# Patient Record
Sex: MALE | Race: WHITE | NOT HISPANIC OR LATINO | Employment: FULL TIME | URBAN - METROPOLITAN AREA
[De-identification: names, ages, dates, MRNs, and addresses within clinical notes are randomized per-mention and may not be internally consistent; named-entity substitution may affect disease eponyms.]

---

## 2017-01-02 PROBLEM — L91.8 INFLAMED ACROCHORDON: Status: ACTIVE | Noted: 2017-01-02

## 2017-01-02 PROBLEM — K13.21 LEUKOPLAKIA ORAL MUCOSA: Status: ACTIVE | Noted: 2017-01-02

## 2017-01-06 PROCEDURE — 00000346 ZZHCL STATISTIC REVIEW OUTSIDE SLIDES TC 88321: Performed by: DERMATOLOGY

## 2017-01-23 LAB — COPATH REPORT: NORMAL

## 2017-08-31 ENCOUNTER — CARE COORDINATION (OUTPATIENT)
Dept: GASTROENTEROLOGY | Facility: CLINIC | Age: 47
End: 2017-08-31

## 2019-10-15 ENCOUNTER — TRANSFERRED RECORDS (OUTPATIENT)
Dept: HEALTH INFORMATION MANAGEMENT | Facility: CLINIC | Age: 49
End: 2019-10-15
Payer: OTHER GOVERNMENT

## 2019-11-08 ENCOUNTER — HEALTH MAINTENANCE LETTER (OUTPATIENT)
Age: 49
End: 2019-11-08

## 2020-12-06 ENCOUNTER — HEALTH MAINTENANCE LETTER (OUTPATIENT)
Age: 50
End: 2020-12-06

## 2021-09-25 ENCOUNTER — HEALTH MAINTENANCE LETTER (OUTPATIENT)
Age: 51
End: 2021-09-25

## 2022-01-15 ENCOUNTER — HEALTH MAINTENANCE LETTER (OUTPATIENT)
Age: 52
End: 2022-01-15

## 2022-01-16 ENCOUNTER — HOSPITAL ENCOUNTER (EMERGENCY)
Facility: CLINIC | Age: 52
Discharge: HOME | End: 2022-01-16
Attending: EMERGENCY MEDICINE | Admitting: EMERGENCY MEDICINE
Payer: OTHER GOVERNMENT

## 2022-01-16 VITALS
RESPIRATION RATE: 18 BRPM | DIASTOLIC BLOOD PRESSURE: 91 MMHG | SYSTOLIC BLOOD PRESSURE: 140 MMHG | OXYGEN SATURATION: 95 % | TEMPERATURE: 98.1 F | HEIGHT: 71 IN | BODY MASS INDEX: 33.6 KG/M2 | WEIGHT: 240 LBS | HEART RATE: 90 BPM

## 2022-01-16 DIAGNOSIS — R40.4 NONSPECIFIC PAROXYSMAL SPELL: ICD-10-CM

## 2022-01-16 LAB
ALBUMIN SERPL-MCNC: 4.2 G/DL (ref 3.4–5)
ALBUMIN UR-MCNC: NEGATIVE MG/DL
ALP SERPL-CCNC: 116 U/L (ref 40–150)
ALT SERPL W P-5'-P-CCNC: 38 U/L (ref 0–70)
ANION GAP SERPL CALCULATED.3IONS-SCNC: 6 MMOL/L (ref 3–14)
APPEARANCE UR: CLEAR
AST SERPL W P-5'-P-CCNC: 20 U/L (ref 0–45)
BASOPHILS # BLD AUTO: 0 10E3/UL (ref 0–0.2)
BASOPHILS NFR BLD AUTO: 0 %
BILIRUB SERPL-MCNC: 0.4 MG/DL (ref 0.2–1.3)
BILIRUB UR QL STRIP: NEGATIVE
BUN SERPL-MCNC: 11 MG/DL (ref 7–30)
CA-I BLD-MCNC: 4.6 MG/DL (ref 4.4–5.2)
CALCIUM SERPL-MCNC: 9 MG/DL (ref 8.5–10.1)
CHLORIDE BLD-SCNC: 106 MMOL/L (ref 94–109)
CK SERPL-CCNC: 124 U/L (ref 30–300)
CO2 SERPL-SCNC: 25 MMOL/L (ref 20–32)
COLOR UR AUTO: YELLOW
CREAT SERPL-MCNC: 0.9 MG/DL (ref 0.66–1.25)
EOSINOPHIL # BLD AUTO: 0.1 10E3/UL (ref 0–0.7)
EOSINOPHIL NFR BLD AUTO: 1 %
ERYTHROCYTE [DISTWIDTH] IN BLOOD BY AUTOMATED COUNT: 13.2 % (ref 10–15)
GFR SERPL CREATININE-BSD FRML MDRD: >90 ML/MIN/1.73M2
GLUCOSE BLD-MCNC: 109 MG/DL (ref 70–99)
GLUCOSE BLDC GLUCOMTR-MCNC: 106 MG/DL (ref 70–99)
GLUCOSE UR STRIP-MCNC: NEGATIVE MG/DL
HCT VFR BLD AUTO: 48.4 % (ref 40–53)
HGB BLD-MCNC: 16.4 G/DL (ref 13.3–17.7)
HGB UR QL STRIP: NEGATIVE
HOLD SPECIMEN: NORMAL
HOLD SPECIMEN: NORMAL
IMM GRANULOCYTES # BLD: 0 10E3/UL
IMM GRANULOCYTES NFR BLD: 1 %
KETONES UR STRIP-MCNC: NEGATIVE MG/DL
LEUKOCYTE ESTERASE UR QL STRIP: NEGATIVE
LIPASE SERPL-CCNC: 127 U/L (ref 73–393)
LYMPHOCYTES # BLD AUTO: 1.7 10E3/UL (ref 0.8–5.3)
LYMPHOCYTES NFR BLD AUTO: 23 %
MAGNESIUM SERPL-MCNC: 2 MG/DL (ref 1.6–2.3)
MCH RBC QN AUTO: 29.3 PG (ref 26.5–33)
MCHC RBC AUTO-ENTMCNC: 33.9 G/DL (ref 31.5–36.5)
MCV RBC AUTO: 86 FL (ref 78–100)
MONOCYTES # BLD AUTO: 0.7 10E3/UL (ref 0–1.3)
MONOCYTES NFR BLD AUTO: 9 %
MUCOUS THREADS #/AREA URNS LPF: PRESENT /LPF
NEUTROPHILS # BLD AUTO: 4.9 10E3/UL (ref 1.6–8.3)
NEUTROPHILS NFR BLD AUTO: 66 %
NITRATE UR QL: NEGATIVE
NRBC # BLD AUTO: 0 10E3/UL
NRBC BLD AUTO-RTO: 0 /100
NT-PROBNP SERPL-MCNC: 19 PG/ML (ref 0–900)
PH UR STRIP: 6 [PH] (ref 5–7)
PHOSPHATE SERPL-MCNC: 1.4 MG/DL (ref 2.5–4.5)
PLATELET # BLD AUTO: 205 10E3/UL (ref 150–450)
POTASSIUM BLD-SCNC: 3.8 MMOL/L (ref 3.4–5.3)
PROT SERPL-MCNC: 8 G/DL (ref 6.8–8.8)
RBC # BLD AUTO: 5.6 10E6/UL (ref 4.4–5.9)
RBC URINE: 2 /HPF
SODIUM SERPL-SCNC: 137 MMOL/L (ref 133–144)
SP GR UR STRIP: 1.01 (ref 1–1.03)
SQUAMOUS EPITHELIAL: <1 /HPF
TROPONIN I SERPL HS-MCNC: 6 NG/L
TSH SERPL DL<=0.005 MIU/L-ACNC: 1.8 MU/L (ref 0.4–4)
UROBILINOGEN UR STRIP-MCNC: NORMAL MG/DL
WBC # BLD AUTO: 7.4 10E3/UL (ref 4–11)
WBC URINE: 1 /HPF

## 2022-01-16 PROCEDURE — 250N000013 HC RX MED GY IP 250 OP 250 PS 637: Performed by: EMERGENCY MEDICINE

## 2022-01-16 PROCEDURE — 99284 EMERGENCY DEPT VISIT MOD MDM: CPT | Mod: 25 | Performed by: EMERGENCY MEDICINE

## 2022-01-16 PROCEDURE — 80053 COMPREHEN METABOLIC PANEL: CPT | Performed by: EMERGENCY MEDICINE

## 2022-01-16 PROCEDURE — 83880 ASSAY OF NATRIURETIC PEPTIDE: CPT | Performed by: EMERGENCY MEDICINE

## 2022-01-16 PROCEDURE — 96360 HYDRATION IV INFUSION INIT: CPT | Performed by: EMERGENCY MEDICINE

## 2022-01-16 PROCEDURE — 96361 HYDRATE IV INFUSION ADD-ON: CPT | Performed by: EMERGENCY MEDICINE

## 2022-01-16 PROCEDURE — 82550 ASSAY OF CK (CPK): CPT | Performed by: EMERGENCY MEDICINE

## 2022-01-16 PROCEDURE — 83735 ASSAY OF MAGNESIUM: CPT | Performed by: EMERGENCY MEDICINE

## 2022-01-16 PROCEDURE — 84100 ASSAY OF PHOSPHORUS: CPT | Performed by: EMERGENCY MEDICINE

## 2022-01-16 PROCEDURE — 258N000003 HC RX IP 258 OP 636: Performed by: EMERGENCY MEDICINE

## 2022-01-16 PROCEDURE — 85004 AUTOMATED DIFF WBC COUNT: CPT | Performed by: EMERGENCY MEDICINE

## 2022-01-16 PROCEDURE — 84443 ASSAY THYROID STIM HORMONE: CPT | Performed by: EMERGENCY MEDICINE

## 2022-01-16 PROCEDURE — 82330 ASSAY OF CALCIUM: CPT | Performed by: EMERGENCY MEDICINE

## 2022-01-16 PROCEDURE — 81001 URINALYSIS AUTO W/SCOPE: CPT | Performed by: EMERGENCY MEDICINE

## 2022-01-16 PROCEDURE — 36415 COLL VENOUS BLD VENIPUNCTURE: CPT | Performed by: EMERGENCY MEDICINE

## 2022-01-16 PROCEDURE — 93010 ELECTROCARDIOGRAM REPORT: CPT | Performed by: EMERGENCY MEDICINE

## 2022-01-16 PROCEDURE — 84484 ASSAY OF TROPONIN QUANT: CPT | Performed by: EMERGENCY MEDICINE

## 2022-01-16 PROCEDURE — 83690 ASSAY OF LIPASE: CPT | Performed by: EMERGENCY MEDICINE

## 2022-01-16 RX ORDER — HYDROXYZINE HYDROCHLORIDE 25 MG/1
25 TABLET, FILM COATED ORAL ONCE
Status: COMPLETED | OUTPATIENT
Start: 2022-01-16 | End: 2022-01-16

## 2022-01-16 RX ADMIN — SODIUM CHLORIDE 1000 ML: 9 INJECTION, SOLUTION INTRAVENOUS at 14:23

## 2022-01-16 RX ADMIN — POTASSIUM & SODIUM PHOSPHATES POWDER PACK 280-160-250 MG 2 PACKET: 280-160-250 PACK at 16:04

## 2022-01-16 RX ADMIN — HYDROXYZINE HYDROCHLORIDE 25 MG: 25 TABLET, FILM COATED ORAL at 16:27

## 2022-01-16 ASSESSMENT — MIFFLIN-ST. JEOR: SCORE: 1950.76

## 2022-01-16 NOTE — ED TRIAGE NOTES
Pt arrives ambulatory with spouse - they were on their way to [place] to be seen by cardiology tomorrow morning. Pt has hx of tachy/arminda syndrome. Pt has tremors throughout body and having anxiety. Pt states he has also been having diarrhea. Symptoms started getting worse 3 hours ago.

## 2022-01-16 NOTE — ED PROVIDER NOTES
"    [place] EMERGENCY DEPARTMENT ([facility])  1/16/22    History     Chief Complaint   Patient presents with     Hypertension     HPI  Malcolm Rosa is a 52 year old male with a history of HTN, possible POTS who presents to the Emergency Department with an episode of hypertension, lightheadedness.  Patient reports he was on his way to the [facility] from [place] for an appointment with internal medicine tomorrow when he had a \"neural endocrine type attack.\"  Patient reports he has been having these attacks for 7+ years and they have been progressively getting worse.  He reports symptoms of hypertension, brain fog, explosive diarrhea, lightheadedness, irritability, anxiety, and nose burning.  Patient reports attacks which can be caused by exercise, stress, sometimes food, and \"dental epinephrine.\"  Patient reports he has not been doing well for the past 1.5 months.  He states he had \"weird cardiology things\" in November and believes he had nocturnal V. tach.  Patient currently has a loop recorder but did not have it at that time.  Loop recorder has seen episodes of NSVT and PVCs per patient.  Patient reports he has been staying at home and not working so he has had less stress and has had only 1-2 minor events.  He states he will wake up in the middle of the night and his pulse will jump from 46 to 146.  He states he was not doing well over [place] and had been worrying more.  He states he thinks he had ulcers around that time with symptoms of gas, bloating, nausea.  He states he took Pepcid and Prilosec and symptoms improved.  Patient reports he tried working out and losing weight in October but developed more arrhythmia and problems so stopped exercise.  Patient reports fluids help symptoms.      Today, patient reports he did not feel well when leaving his house.  He states he has noted that even driving causes stress that can trigger these episodes.  He states symptoms build up over 3 hours.  Initially " his heart rate was fine but he felt slightly hypertensive and lightheaded.  He states when he got here he had an attack and became really hypertensive believing he had systolic in the 200s.  He states he was also lightheaded and thought he was going to pass out.  Patient reports symptoms resolve within about 2 hours and he will feel tired after.  He states he really is currently coming down but is feeling tired.  Patient reports he did have a headache today during the episode.  He states headaches are typical but he has noted more headaches recently compared to when symptoms first started years ago.  Patient denies associated chest pain or shortness of breath.  He notes some abdominal cramping in the hypogastric region.  Patient denies nausea, vomiting, leg swelling, or urinary symptoms.  He states he does have history of microscopic blood in urinalysis but no gross blood seen in urine.  Patient denies numbness, tingling, or weakness.  He states his hands and feet get cold, specifically at night.      Patient has had extensive work-up for these symptoms.  His cardiologist in [place] has diagnosed him with POTS but he thinks this diagnosis was rushed.  He states he sometimes has lightheadedness when standing up quickly and tachycardia at night but does not think POTS diagnosis fits at all times.  Patient reports hypertension is acute and not chronic.  He states he has tried taking low-dose metoprolol but blood pressure gets too low when he feels dizzy.  Patient has had a bone biopsy for mass activation, mastocytosis was negative.  Patient has had CT scans looking for masses and tumors without significant findings beyond diverticuli.  Patient had a recent EGD that was similar to previous.  He has had imaging on his head done although this was years ago.  He states at one time there was thought to be a potential pituitary mass but on second opinion it was normal.  Patient has been prescribed alprazolam for symptoms but  "has not taken this.  Patient has been tested for CY.  He has also been tested for pheochromocytoma, states mother had this.  Patient has family cardiac history, states grandfather  at 53 and mother had quadruple bypass at 74.  Patient denies alcohol or drug use.  He does note some caffeine use with coffee in the morning.  He states this causes \"bile acid diarrhea\" that burns, has an odor, is watery, and makes him feel \"off.\"  He denies blood in the stool.  Patient denies recent illness, fever, or cough.  No known COVID exposure.  Patient reports he has received 1 dose of COVID vaccination but then his health issues worsened.    Past Medical History  Past Medical History:   Diagnosis Date     Abnormal laboratory test     slightly elevated glucagon     Benign essential hypertension      Chronic fatigue syndrome      Epistaxis      Fatty liver      Gynecomastia      Herniated cervical disc without myelopathy      Hyperglycemia      Hypophosphatemia      Insomnia      Left varicocele      Prostatic hypertrophy      Pure hypercholesterolemia      Past Surgical History:   Procedure Laterality Date     ARTHROSCOPY KNEE BILATERAL       CHOLECYSTECTOMY  11/13/15     FUSION CERVICAL ANTERIOR ONE LEVEL      C6-7     HERNIA REPAIR Bilateral     inguinal and umbilical     VARICOCELECTOMY Left      VASECTOMY       clobetasol (TEMOVATE) 0.05 % GEL topical gel  multivitamin, therapeutic with minerals (THERA-VIT-M) TABS      Allergies   Allergen Reactions     Prednisone Other (See Comments)     In high doses. Became nauseated and confused also.     Family History  Family History   Problem Relation Age of Onset     Skin Cancer No family hx of      Melanoma No family hx of      Social History   Social History     Tobacco Use     Smoking status: Never Smoker     Smokeless tobacco: Not on file   Substance Use Topics     Alcohol use: Not on file     Drug use: Not on file      Past medical history, past surgical history, medications, " "allergies, family history, and social history were reviewed with the patient. No additional pertinent items.       Review of Systems  A complete review of systems was performed with pertinent positives and negatives noted in the HPI, and all other systems negative.    Physical Exam   BP: (!) 177/109  Pulse: 100  Temp: 98.1  F (36.7  C)  Resp: 18  Height: 180.3 cm (5' 11\")  Weight: 108.9 kg (240 lb)  SpO2: 99 %  Physical Exam  Vitals reviewed.   Constitutional:       General: He is not in acute distress.     Appearance: He is well-developed.   HENT:      Head: Normocephalic and atraumatic.      Nose: Nose normal.      Mouth/Throat:      Mouth: Mucous membranes are moist.   Eyes:      General: No visual field deficit.     Extraocular Movements: Extraocular movements intact.      Conjunctiva/sclera: Conjunctivae normal.      Pupils: Pupils are equal, round, and reactive to light.   Cardiovascular:      Rate and Rhythm: Normal rate and regular rhythm.      Pulses: Normal pulses.      Heart sounds: Normal heart sounds. No murmur heard.  Pulmonary:      Effort: Pulmonary effort is normal. No respiratory distress.      Breath sounds: Normal breath sounds. No stridor. No wheezing or rales.   Abdominal:      General: Bowel sounds are normal. There is no distension.      Palpations: Abdomen is soft. There is no mass.      Tenderness: There is no abdominal tenderness. There is no guarding or rebound.   Musculoskeletal:         General: No swelling or tenderness. Normal range of motion.      Cervical back: Normal range of motion and neck supple. No rigidity.   Skin:     General: Skin is warm and dry.      Capillary Refill: Capillary refill takes less than 2 seconds.      Findings: No rash.   Neurological:      General: No focal deficit present.      Mental Status: He is alert and oriented to person, place, and time.      GCS: GCS eye subscore is 4. GCS verbal subscore is 5. GCS motor subscore is 6.      Cranial Nerves: Cranial " nerves are intact. No cranial nerve deficit, dysarthria or facial asymmetry.      Sensory: Sensation is intact. No sensory deficit.      Motor: No weakness, abnormal muscle tone or pronator drift.      Coordination: Coordination normal. Finger-Nose-Finger Test and Heel to Shin Test normal.      Deep Tendon Reflexes:      Reflex Scores:       Brachioradialis reflexes are 2+ on the right side and 2+ on the left side.       Patellar reflexes are 2+ on the right side and 2+ on the left side.     Comments: No clonus. 5/5 strength in all 4 extremities bilaterally. Sensation intact in all 4 extremities and face bilaterally.    Psychiatric:         Mood and Affect: Mood normal.         ED Course   1:40 PM  The patient was seen and examined by Angelica Ramirez MD in Room ED11.      Procedures            EKG Interpretation:      Interpreted by Angelica Ramirez MD  Time reviewed: 1:45 pm  Symptoms at time of EKG: light headedness   Rhythm: normal sinus   Rate: normal  Axis: normal  Ectopy: none  Conduction: normal  ST Segments/ T Waves: No ST-T wave changes  Q Waves: none  Comparison to prior: No old EKG available    Clinical Impression: Normal sinus rhythm.  Normal intervals.  No evidence of acute ischemia.  No sign of WPW, LVH, Brugada syndrome, or right ventricular dysplasia.        The medical record was reviewed and interpreted.  Current labs reviewed and interpreted.  EKG reviewed and interpreted: As above.       ED Course as of 03/27/22 2359   Sun Jan 16, 2022   1437 Patient declines CXR, CT of the head, or further chest or abdominal imaging.    1437 CT angio of the chest and CT of the abdomen and pelvis with contrast on 11/24/21 was without PE or masses or acute pathology per care everywhere review.             Results for orders placed or performed during the hospital encounter of 01/16/22   Comprehensive metabolic panel     Status: Abnormal   Result Value Ref Range    Sodium 137 133 - 144 mmol/L    Potassium 3.8  3.4 - 5.3 mmol/L    Chloride 106 94 - 109 mmol/L    Carbon Dioxide (CO2) 25 20 - 32 mmol/L    Anion Gap 6 3 - 14 mmol/L    Urea Nitrogen 11 7 - 30 mg/dL    Creatinine 0.90 0.66 - 1.25 mg/dL    Calcium 9.0 8.5 - 10.1 mg/dL    Glucose 109 (H) 70 - 99 mg/dL    Alkaline Phosphatase 116 40 - 150 U/L    AST 20 0 - 45 U/L    ALT 38 0 - 70 U/L    Protein Total 8.0 6.8 - 8.8 g/dL    Albumin 4.2 3.4 - 5.0 g/dL    Bilirubin Total 0.4 0.2 - 1.3 mg/dL    GFR Estimate >90 >60 mL/min/1.73m2   Magnesium     Status: Normal   Result Value Ref Range    Magnesium 2.0 1.6 - 2.3 mg/dL   TSH with free T4 reflex     Status: Normal   Result Value Ref Range    TSH 1.80 0.40 - 4.00 mU/L   Troponin I     Status: Normal   Result Value Ref Range    Troponin I High Sensitivity 6 <79 ng/L   UA with Microscopic reflex to Culture     Status: Abnormal    Specimen: Urine, Midstream   Result Value Ref Range    Color Urine Yellow Colorless, Straw, Light Yellow, Yellow    Appearance Urine Clear Clear    Glucose Urine Negative Negative mg/dL    Bilirubin Urine Negative Negative    Ketones Urine Negative Negative mg/dL    Specific Gravity Urine 1.012 1.003 - 1.035    Blood Urine Negative Negative    pH Urine 6.0 5.0 - 7.0    Protein Albumin Urine Negative Negative mg/dL    Urobilinogen Urine Normal Normal, 2.0 mg/dL    Nitrite Urine Negative Negative    Leukocyte Esterase Urine Negative Negative    Mucus Urine Present (A) None Seen /LPF    RBC Urine 2 <=2 /HPF    WBC Urine 1 <=5 /HPF    Squamous Epithelials Urine <1 <=1 /HPF    Narrative    Urine Culture not indicated   CBC with platelets and differential     Status: None   Result Value Ref Range    WBC Count 7.4 4.0 - 11.0 10e3/uL    RBC Count 5.60 4.40 - 5.90 10e6/uL    Hemoglobin 16.4 13.3 - 17.7 g/dL    Hematocrit 48.4 40.0 - 53.0 %    MCV 86 78 - 100 fL    MCH 29.3 26.5 - 33.0 pg    MCHC 33.9 31.5 - 36.5 g/dL    RDW 13.2 10.0 - 15.0 %    Platelet Count 205 150 - 450 10e3/uL    % Neutrophils 66 %    %  Lymphocytes 23 %    % Monocytes 9 %    % Eosinophils 1 %    % Basophils 0 %    % Immature Granulocytes 1 %    NRBCs per 100 WBC 0 <1 /100    Absolute Neutrophils 4.9 1.6 - 8.3 10e3/uL    Absolute Lymphocytes 1.7 0.8 - 5.3 10e3/uL    Absolute Monocytes 0.7 0.0 - 1.3 10e3/uL    Absolute Eosinophils 0.1 0.0 - 0.7 10e3/uL    Absolute Basophils 0.0 0.0 - 0.2 10e3/uL    Absolute Immature Granulocytes 0.0 <=0.4 10e3/uL    Absolute NRBCs 0.0 10e3/uL   Extra Blue Top Tube     Status: None   Result Value Ref Range    Hold Specimen JIC    Extra Red Top Tube     Status: None   Result Value Ref Range    Hold Specimen JIC    Lipase     Status: Normal   Result Value Ref Range    Lipase 127 73 - 393 U/L   Ionized Calcium     Status: Normal   Result Value Ref Range    Calcium Ionized 4.6 4.4 - 5.2 mg/dL   Phosphorus     Status: Abnormal   Result Value Ref Range    Phosphorus 1.4 (L) 2.5 - 4.5 mg/dL   CK total     Status: Normal   Result Value Ref Range     30 - 300 U/L   Nt probnp inpatient (BNP)     Status: Normal   Result Value Ref Range    N terminal Pro BNP Inpatient 19 0 - 900 pg/mL   Glucose by meter     Status: Abnormal   Result Value Ref Range    GLUCOSE BY METER POCT 106 (H) 70 - 99 mg/dL   EKG 12 lead     Status: None   Result Value Ref Range    Systolic Blood Pressure  mmHg    Diastolic Blood Pressure  mmHg    Ventricular Rate 76 BPM    Atrial Rate 76 BPM    NE Interval 180 ms    QRS Duration 88 ms     ms    QTc 423 ms    P Axis 41 degrees    R AXIS 38 degrees    T Axis 21 degrees    Interpretation ECG       Sinus rhythm  Normal ECG  Unconfirmed report - interpretation of this ECG is computer generated - see medical record for final interpretation  Confirmed by - EMERGENCY ROOM, PHYSICIAN ([id number]),  GREGG AYERS ([id number]) on 1/18/2022 5:58:21 PM     CBC with platelets differential     Status: None    Narrative    The following orders were created for panel order CBC with platelets differential.  Procedure                                Abnormality         Status                     ---------                               -----------         ------                     CBC with platelets and d...[[id number]]                      Final result                 Please view results for these tests on the individual orders.   [place] Draw     Status: None    Narrative    The following orders were created for panel order [place] Draw.  Procedure                               Abnormality         Status                     ---------                               -----------         ------                     Extra Blue Top Tube[[id number]]                              Final result               Extra Red Top Tube[[id number]]                               Final result                 Please view results for these tests on the individual orders.   Extra Tube *Canceled*     Status: None ()    Narrative    The following orders were created for panel order Extra Tube.  Procedure                               Abnormality         Status                     ---------                               -----------         ------                     Extra Green Top (Lithium...[[id number]]                                                   Please view results for these tests on the individual orders.     Medications   0.9% sodium chloride BOLUS (0 mLs Intravenous Stopped 1/16/22 1628)   potassium & sodium phosphates (NEUTRA-PHOS) Packet 2 packet (2 packets Oral Given 1/16/22 1604)   hydrOXYzine (ATARAX) tablet 25 mg (25 mg Oral Given 1/16/22 1997)        Assessments & Plan (with Medical Decision Making)   Patient presents with now having ongoing issues with episodes of spells as he is describing above.  This has been going on for the last 7 years but worsening over the last month and a half or so in discussion with the patient and his wife.  He was actually driving down to [place] for an appointment tomorrow for further work-up of this when he had a  spell and thus pulled over here to be seen.  He has had a very large work-up as discussed above an attempt to try to diagnose these spells.  He has had multiple imaging and laboratory studies done.  He does have family history of pheochromocytoma and so he has been tested for this without sign of this previously.  He is convinced that this is a neuroendocrine attack that is causing this.  There is also been some suggestion of possible POTS.  At this point however this is still been uncertain and thus he was going to [facility] for further evaluation.  At the time of my evaluation, he reports that he is feeling improved As this does typically improve spontaneously.  His blood pressure is mildly elevated to the 150s to 140s range.  On presentation he was 177/109 with a heart rate of 100.  Unfortunately the differential diagnosis for this is difficult as he has had multiple in-depth evaluations as discussed above.  Did consider the potential of electrolyte dysfunction, hypoglycemia, anxiety reaction, neuroendocrine cause however this has not yet been found, atypical acute coronary syndrome, rhabdomyolysis, thyroid dysfunction, intracranial cause.  Did discuss performing CT of the head however patient declines this at this time as he has had this previously and is planning to follow-up with [facility] tomorrow.  He did have a CT angiogram of the chest and CT of the abdomen and pelvis with contrast in November that was without PE or masses or acute pathology per care everywhere review.  He declines chest x-ray here any further chest or abdominal imaging as he states he has had multiple of these in the past would like to avoid further radiation currently until he has more testing performed at [facility].  Did discuss the risks of not performing these imaging studies including missing life-threatening diagnoses such as intracranial mass or hemorrhage, PE, intra-abdominal mass and he voiced understanding stating all of these things have been  evaluated previously.  Did obtain laboratory studies here.  We did start IV fluids as he had stated this is improved his symptoms in the past.    I was called into the room by the nurse as he reportedly started having an episode after the fluids had gone in half way.  He did appear anxious and was somewhat tremulous however his blood pressure remained stable in the 140s.  Do question whether the cold aspect of the fluids may have caused him to feel this way or whether this could have potentially been an anxiety reaction.  He states this is very similar to his other episodes however usually does not happen with fluids.  Discussed that I do not have an exact explanation for this.  Did offer hydroxyzine which he did take and did feel improved with.  He is also question whether this could be a mast activation syndrome.  Did check his glucose at that time and this was still within normal limits at 106.  Also considered an arrhythmia however no sign of arrhythmia on the monitor during this episode.  EKG was also obtained which revealed normal sinus rhythm with normal intervals and no evidence of acute ischemia.  Has a loop recorder that unfortunately cannot interrogate.  However again he was monitored during this episode without any signs of arrhythmia making this less likely.  CMP reveals normal electrolytes normal renal function and normal liver function.  Magnesium is within normal limits.  Troponin is within normal limits.  With reassuring EKG without signs of acute ischemia felt that acute coronary syndrome was less likely.  EKG also did not show any signs of Brugada syndrome, LVH, WPW, or right ventricular dysplasia.  No sign of arrhythmia.  CBC reveals a normal white blood cell count of 7.4 with hemoglobin of 16.4.  TSH is within normal limits.  Lipase is within normal limits.  BNP is within normal limits.  CK is also within normal limits.  Urinalysis was unremarkable without any evidence of UTI or hematuria.   Phosphorus was low at 1.4 and thus this was replaced with potassium sodium phosphate orally.  Ionized calcium is normal at 4.6.  He was feeling improved on reevaluation after the hydroxyzine.    Had a long discussion with the patient and his wife that I do not know exactly what is causing his symptoms.  Discussed the potential for observation here in our hospital for further evaluation versus proceeding on to [place] for the testing that is planned for tomorrow.  Did discuss the potential that this could be anxiety driven but do understand his concern for a medical cause as this has not been improving.  After discussion they did prefer to continue to [place] to have their evaluation as planned tomorrow.  He does have a prescription for alprazolam that he has not tried in the past.  I discussed that if he were to have further issue during the car ride to mail that he could try this as this would be unlikely to cause harm with a one-time dose.  Did discuss the risk of addiction to this medicine which he already understands and is why he has not been taking this yet at this point.  However again did discuss that a one-time dose to get him through the episode to get him to [place] would be appropriate.  Discussed return precautions.  He and his wife are in agreement with this plan.  He was discharged in stable condition.    I have reviewed the nursing notes. I have reviewed the findings, diagnosis, plan and need for follow up with the patient.    Discharge Medication List as of 1/16/2022  6:30 PM          Final diagnoses:   Nonspecific paroxysmal spell     IRiya, am serving as a trained medical scribe to document services personally performed by Angelica Ramirez MD, based on the provider's statements to me.      I, Angelica Ramirez MD, was physically present and have reviewed and verified the accuracy of this note documented by Riya Villanueva.      --  Angelica Ramirez MD  [facility] EMERGENCY  DEPARTMENT  1/16/2022     Angelica Ramirez MD  03/28/22 0056

## 2022-01-17 NOTE — DISCHARGE INSTRUCTIONS
Continue to follow up at [place] as scheduled.     Return to the ED if you develop any new or worsening concerns.

## 2022-01-18 LAB
ATRIAL RATE - MUSE: 76 BPM
DIASTOLIC BLOOD PRESSURE - MUSE: NORMAL MMHG
INTERPRETATION ECG - MUSE: NORMAL
P AXIS - MUSE: 41 DEGREES
PR INTERVAL - MUSE: 180 MS
QRS DURATION - MUSE: 88 MS
QT - MUSE: 376 MS
QTC - MUSE: 423 MS
R AXIS - MUSE: 38 DEGREES
SYSTOLIC BLOOD PRESSURE - MUSE: NORMAL MMHG
T AXIS - MUSE: 21 DEGREES
VENTRICULAR RATE- MUSE: 76 BPM

## 2022-04-11 ENCOUNTER — MEDICAL CORRESPONDENCE (OUTPATIENT)
Dept: HEALTH INFORMATION MANAGEMENT | Facility: CLINIC | Age: 52
End: 2022-04-11
Payer: OTHER GOVERNMENT

## 2022-04-14 NOTE — TELEPHONE ENCOUNTER
RECORDS RECEIVED FROM:  External   DATE RECEIVED: 4.18.22   NOTES STATUS DETAILS   OFFICE NOTE from referring provider        OFFICE NOTE from other cardiologists   and neurologists Care Everywhere 4.12.22 Renetta Manrique  4.11.22 Renetta Blake  3.14.22 (loop recorder visit), [facility]  3.8.22 Renetta Will   More in CE if needed   DISCHARGE SUMMARY from hospital        DISCHARGE REPORT from the ER    Care Everywhere 4.6.22 [facility]  [facility]  3.4.22 [place]  3.2.22 [facility]  More in [facility]    OPERATIVE REPORT        MEDICATION LIST   Care Everywhere    LABS     BMP   Care Everywhere 3.20.22   CBC   Care Everywhere 3.4.22   CMP   Care Everywhere 4.6.22   Lipids       TSH   Care Everywhere 3.4.22   DIAGNOSTIC PROCEDURES     EKG  Strips *important In process 4.12.22 [facility]  4.7.22 [facility]  3.20.22 [facility]  3.14.22 [facility]   Monitor Reports  Strips *important Received 3.15.21 Paceart device evaluation, [facility]   Cardioversions       ICD/pacemaker implant       Tilt table studies       IMAGING (DISC & REPORT)      ECHO's   PACS 3.20.22, 10.27.21 [facility]   Stress Tests       Cath       CT/CTA       MRI/MRA         Action 4.14.22 MJ   Action Taken Sent request to [facility] and [facility]     Action 4.18.22 MJ   Action Taken Received report and notes from [facility]. Sent to urgent scanning.     Action 4.22.22 MJ   Action Taken Sent request to [place] and [facility].     Action 4.25.22 MJ   Action Taken 3rd request.     Action 4.28.22 MJ   Action Taken Resolved images into PACS

## 2022-04-15 ENCOUNTER — DOCUMENTATION ONLY (OUTPATIENT)
Dept: CARDIOLOGY | Facility: CLINIC | Age: 52
End: 2022-04-15
Payer: OTHER GOVERNMENT

## 2022-04-15 DIAGNOSIS — R00.1 BRADYCARDIA: Primary | ICD-10-CM

## 2022-04-15 DIAGNOSIS — R00.0 TACHYCARDIA: ICD-10-CM

## 2022-04-18 ENCOUNTER — PRE VISIT (OUTPATIENT)
Dept: CARDIOLOGY | Facility: CLINIC | Age: 52
End: 2022-04-18
Payer: OTHER GOVERNMENT

## 2022-04-18 ENCOUNTER — TELEPHONE (OUTPATIENT)
Dept: CARDIOLOGY | Facility: CLINIC | Age: 52
End: 2022-04-18

## 2022-04-18 NOTE — TELEPHONE ENCOUNTER
pt wanted a sooner appt then 4/25 . there was one today but the pt lives far away so he will keep his  orginal appt

## 2022-04-25 ENCOUNTER — TELEPHONE (OUTPATIENT)
Dept: CARDIOLOGY | Facility: CLINIC | Age: 52
End: 2022-04-25
Payer: OTHER GOVERNMENT

## 2022-04-25 ENCOUNTER — OFFICE VISIT (OUTPATIENT)
Dept: CARDIOLOGY | Facility: CLINIC | Age: 52
End: 2022-04-25
Attending: INTERNAL MEDICINE
Payer: OTHER GOVERNMENT

## 2022-04-25 ENCOUNTER — MYC MEDICAL ADVICE (OUTPATIENT)
Dept: CARDIOLOGY | Facility: CLINIC | Age: 52
End: 2022-04-25
Payer: OTHER GOVERNMENT

## 2022-04-25 VITALS
OXYGEN SATURATION: 97 % | SYSTOLIC BLOOD PRESSURE: 128 MMHG | HEIGHT: 71 IN | BODY MASS INDEX: 34.65 KG/M2 | HEART RATE: 68 BPM | DIASTOLIC BLOOD PRESSURE: 81 MMHG | WEIGHT: 247.5 LBS

## 2022-04-25 DIAGNOSIS — R00.0 TACHYCARDIA: ICD-10-CM

## 2022-04-25 DIAGNOSIS — Z20.822 ENCOUNTER FOR LABORATORY TESTING FOR COVID-19 VIRUS: ICD-10-CM

## 2022-04-25 DIAGNOSIS — I47.10 SVT (SUPRAVENTRICULAR TACHYCARDIA) (H): Primary | ICD-10-CM

## 2022-04-25 DIAGNOSIS — R00.2 PALPITATIONS: ICD-10-CM

## 2022-04-25 LAB — SARS-COV-2 RNA RESP QL NAA+PROBE: NEGATIVE

## 2022-04-25 PROCEDURE — G0463 HOSPITAL OUTPT CLINIC VISIT: HCPCS | Mod: 25

## 2022-04-25 PROCEDURE — U0003 INFECTIOUS AGENT DETECTION BY NUCLEIC ACID (DNA OR RNA); SEVERE ACUTE RESPIRATORY SYNDROME CORONAVIRUS 2 (SARS-COV-2) (CORONAVIRUS DISEASE [COVID-19]), AMPLIFIED PROBE TECHNIQUE, MAKING USE OF HIGH THROUGHPUT TECHNOLOGIES AS DESCRIBED BY CMS-2020-01-R: HCPCS | Performed by: INTERNAL MEDICINE

## 2022-04-25 PROCEDURE — 99205 OFFICE O/P NEW HI 60 MIN: CPT | Performed by: INTERNAL MEDICINE

## 2022-04-25 PROCEDURE — 93005 ELECTROCARDIOGRAM TRACING: CPT

## 2022-04-25 RX ORDER — LIDOCAINE 40 MG/G
CREAM TOPICAL
Status: CANCELLED | OUTPATIENT
Start: 2022-04-25

## 2022-04-25 RX ORDER — SODIUM CHLORIDE 9 MG/ML
INJECTION, SOLUTION INTRAVENOUS CONTINUOUS
Status: CANCELLED | OUTPATIENT
Start: 2022-04-25

## 2022-04-25 ASSESSMENT — PAIN SCALES - GENERAL: PAINLEVEL: NO PAIN (0)

## 2022-04-25 NOTE — PATIENT INSTRUCTIONS
You were seen in the Electrophysiology Clinic today by: Dr Fuller    Plan:     You are scheduled for an SVT ablation, at [facility] [place] with Dr. Fuller. The hospital is located at [address] [place] [address] on the [place] bank of the campus.  If you need to cancel this procedure, please call [phone].      You will need to undergo a COVID-19 PCR swab test within 4 days of procedure. You will receive a phone call with more information. If you do not hear from the COVID scheduling team, please call: [phone] to schedule.    Visitor Policy: One visitor.    EP Study/ possible Supraventricular Tachycardia Ablation (SVT)    Date:______  Time: _______________To the [facility] Waiting Room at the [facility]    1. Your history and physical will be completed by our nurse practitioner when you arrive.  2. Please do not eat anything for 8 hours prior to your procedure. You may have sips of water up until 2 hours prior to your arrival.  3. If you are diabetic contact your diabetes team if you have questions  4. The morning of your procedure, you may take your scheduled medications with a sip of water   5. You will discharge the same day and need a .      Post-Procedure Instructions    Care of groin site:   Remove the Band-Aid after 24 hours. If there is minor oozing, apply another Band-aid and remove it after 12 hours.    Do NOT take a bath, use a hot tub, pool, or submerse in water for at least 3 days. You may shower.    It is normal to have a small bruise or lump at the site.   Do not scrub the site.   Do not use lotion or powder near the puncture site for 3 days.    If you start bleeding from the site in your groin: Lie down flat and press firmly on the site. Call your physician immediately, or, come to the emergency room.  Call 911 right away if you have bleeding that is heavy or does not stop.    Call your doctor/provider if:    You have a large or growing hard lump around the  site.    The site is red, swollen, hot or tender.    Blood or fluid is draining from the site.    You have chills or a fever greater than 101 F (38 C).    Your leg or arm turns bluish, feels numb or cool.    You have hives, a rash or unusual itching.    Activity Restrictions   For the first 2 days: Do not stoop or squat. When you cough, sneeze or move your bowels, hold your hand over the puncture site and press gently.   Do not lift more than 10 pounds or exertional activity for 10 days.  - No driving for 24 hours after (with or without general anesthesia).         Date: _______ Follow up with Dr. Fuller.      Please do not hesitate to utilize [employer] or call us if you have any questions or concerns.        Your Care Team:   Cardiology   Telephone Number     Nurse Line  Mena Correa RN  [phone]     For scheduling appts or procedures:    Hilda Richardson   [phone]   For the Device Clinic (Pacemakers, ICDs, Loop Recorders)    During business hours: [phone]  After business hours:   [phone]- select option 4 and ask for job code [id number].     On-call cardiologist for after hours or on weekends: [phone], option #4, and ask to speak to the on-call cardiologist.     Cardiovascular Clinic:   [address] [place] [address]. [place] [address]      As always, Thank you for trusting us with your health care needs!

## 2022-04-25 NOTE — TELEPHONE ENCOUNTER
[facility] Call Center    Phone Message    May a detailed message be left on voicemail: yes     Reason for Call: same day add on Takumi 4/25 10a     Action Taken: Other: routed [facility] cardiolog    Travel Screening: Negative

## 2022-04-25 NOTE — PROGRESS NOTES
HPI:   Malcolm Rosa is a 52yr male with PMH of HTN, HLD, fatty liver, GERD, chronic chest pain, CY, and nocturnal tachycardia s/p ILR placement.  He was referred for a cardiology evaluation.  He loretta been suffering from tachycardia during sleep, which presented with a sudden onset and gradual offset. It woke him up a couple of times every night with an hour interval. The first one usually occurred at 11PM-1AM.  He has had no tachycardia during daytime.  He reported that Versed cold induce his tachycardia.   He changed multiple things in his daily life, but nothing affected his symptoms.    PAST MEDICAL HISTORY:  Past Medical History:   Diagnosis Date     Abnormal laboratory test     slightly elevated glucagon     Benign essential hypertension      Chronic fatigue syndrome      Epistaxis      Fatty liver      Gynecomastia      Herniated cervical disc without myelopathy      Hyperglycemia      Hypophosphatemia      Insomnia      Left varicocele      Prostatic hypertrophy      Pure hypercholesterolemia        CURRENT MEDICATIONS:  Current Outpatient Medications   Medication Sig Dispense Refill     multivitamin, therapeutic with minerals (THERA-VIT-M) TABS Take 1 tablet by mouth daily         PAST SURGICAL HISTORY:  Past Surgical History:   Procedure Laterality Date     ARTHROSCOPY KNEE BILATERAL       CHOLECYSTECTOMY  11/13/15     FUSION CERVICAL ANTERIOR ONE LEVEL      C6-7     HERNIA REPAIR Bilateral     inguinal and umbilical     VARICOCELECTOMY Left      VASECTOMY         ALLERGIES:     Allergies   Allergen Reactions     Diphenhydramine Other (See Comments) and Unknown     VERY SENSITIVE TO IT.  Makes goofy  VERY SENSITIVE TO IT.  Makes goofy  Makes goofy  VERY SENSITIVE TO IT.  VERY SENSITIVE TO IT.  Makes goofy       Epinephrine Other (See Comments)     Other reaction(s): Tachycardia  B/P  Issues. Pt is unsure if he becomes hypotensive or hypertensive.   B/P  issues       Prednisone Other (See Comments)     " In high doses. Became nauseated and confused also.     Sulfamethoxazole W/Trimethoprim Anxiety and Rash     Mild itching with red bumpy rash and also made me real anxious  Mild itching with red bumpy rash and also made me real anxious         FAMILY HISTORY:  + Premature coronary artery disease  - Atrial fibrillation  + Sudden cardiac death     SOCIAL HISTORY:  Social History     Tobacco Use     Smoking status: Never Smoker     Smokeless tobacco: Never Used       ROS:   Constitutional: No fever, chills, or sweats. Weight stable.   ENT: No visual disturbance, ear ache, epistaxis, sore throat.   Cardiovascular: As per HPI.   Respiratory: No cough, hemoptysis.    GI: No nausea, vomiting, hematemesis, melena, or hematochezia.   : No hematuria.   Integument: Negative.   Psychiatric: Negative.   Hematologic:  Easy bruising, no easy bleeding.  Neuro: Negative.   Endocrinology: No significant heat or cold intolerance   Musculoskeletal: No myalgia.    Exam:  /81 (BP Location: Right arm, Patient Position: Supine, Cuff Size: Adult Regular)   Pulse 68   Ht 1.803 m (5' 11\")   Wt 112.3 kg (247 lb 8 oz)   SpO2 97%   BMI 34.52 kg/m    GENERAL APPEARANCE: healthy, alert and no distress  HEENT: no icterus, no xanthelasmas, normal pupil size and reaction, normal palate, mucosa moist, no central cyanosis  NECK: no adenopathy, no asymmetry, masses, or scars, thyroid normal to palpation and no bruits, JVP not elevated  RESPIRATORY: lungs clear to auscultation - no rales, rhonchi or wheezes, no use of accessory muscles, no retractions, respirations are unlabored, normal respiratory rate  CARDIOVASCULAR: regular rhythm, normal S1 with physiologic split S2, no S3 or S4 and no murmur, click or rub, precordium quiet with normal PMI.  ABDOMEN: soft, non tender, without hepatosplenomegaly, no masses palpable, bowel sounds normal, aorta not enlarged by palpation, no abdominal bruits  EXTREMITIES: peripheral pulses normal, no edema, " no bruits  NEURO: alert and oriented to person/place/time, normal speech, gait and affect  VASC: Radial, femoral, dorsalis pedis and posterior tibialis pulses are normal in volumes and symmetric bilaterally. No bruits are heard.  SKIN: no ecchymoses, no rashes    Labs:  CBC RESULTS:   Lab Results   Component Value Date    WBC 7.4 01/16/2022    RBC 5.60 01/16/2022    HGB 16.4 01/16/2022    HCT 48.4 01/16/2022    MCV 86 01/16/2022    MCH 29.3 01/16/2022    MCHC 33.9 01/16/2022    RDW 13.2 01/16/2022     01/16/2022       BMP RESULTS:  Lab Results   Component Value Date     01/16/2022    POTASSIUM 3.8 01/16/2022    CHLORIDE 106 01/16/2022    CO2 25 01/16/2022    ANIONGAP 6 01/16/2022     (H) 01/16/2022     (H) 01/16/2022    BUN 11 01/16/2022    CR 0.90 01/16/2022    GFRESTIMATED >90 01/16/2022    XOCHITL 9.0 01/16/2022        INR RESULTS:  No results found for: INR    Procedures:  ECG on 4/25/2022: Reviewed.  WNL.    3/14/2022 lexiscan WNL  3/20/22 Echo WNL  ILR checks 1/17/22 - reports NSR but then a faster episode, 4/19/22 arminda at night, 1 symptom event (in care everywhere)    ILR interrogation on 4/22/2022: Reviewed.  Remote Impression and Plan   Place of Service Home;ILR;Alert   Final Impression Normal Remote Monitor with Events   Implanting Physician Dr. Albarado   EF 55   EF Tests Echo   EF Test DOS 10/27/21   Events/Comments Alert for 2 Tachy and 2 symptom activated events (BSCLUX)Tachy episodes T-7,8 occured 4/21/22 between 13:20 and 13:29 with v rates 143-146bpm, duration 18-19seconds, supports ST, Symptom PT-50 occured while in bed, c/o heart racing occured 4/22/22 @00:22 supports ST @ 110bpm, PT-49 occured 4/21/22 @01:22 while in bed, c/o heart racing egm supports SR @ 60-62bpm   In Basket Sent to Víctor Manrique PA-C   Follow Up Plan follow-up as scheduled;1 month remote monitor   Plan of Care Full report under Media/CV tab   Anticoagulation No   Battery OK   Heart Rate Histograms  Adequate histograms   Non-sustained 2   E-grams Support (appears to be) Sinus Tachycardia   Presenting Rhythm VS   Presenting Heart Rate 79   Indication VT               Assessment and Plan:   # HTN  # HLD  # Fatty liver  # GERD  # Chronic chest pain  # CY  # Normal lexiscan on 3/14/2022.   # Normal TTE on 3/20/22  # s/p ILR placement.  # Nocturnal tachycardia -> SVT. No specific triggers. Induced by Versed.  His SVT is likely to be atrial tachycardia.  We discussed treatment options including medication (Flecainide) and catheter ablation.  The nature, risks, benefits, alternatives and anticipated results of the procedure were explained to the patient. These risks include but are not limited to local vascular damage, bleeding, pulmonary vein stenosis, AV block requiring a pacemaker implantation, tamponade and stroke. After careful consideration the patient wished to proceed with the procedure. The patient was verbally consented. We will schedule the patient to have this done at his earliest convenience.     I spent a total of 70 min today to review the records, see the patient, and complete the documents.    CC  Patient Care Team:  Dee Damon MD as PCP - Min Chung MD as Referring Physician  Phillip Blanco MD as MD (Dermapathology)  Hunter Fuller MD (Cardiovascular Disease)  Mena Correa, RN as Specialty Care Coordinator  DEE DAMON

## 2022-04-26 LAB
ATRIAL RATE - MUSE: 60 BPM
DIASTOLIC BLOOD PRESSURE - MUSE: NORMAL MMHG
INTERPRETATION ECG - MUSE: NORMAL
P AXIS - MUSE: 47 DEGREES
PR INTERVAL - MUSE: 158 MS
QRS DURATION - MUSE: 92 MS
QT - MUSE: 418 MS
QTC - MUSE: 418 MS
R AXIS - MUSE: 55 DEGREES
SYSTOLIC BLOOD PRESSURE - MUSE: NORMAL MMHG
T AXIS - MUSE: 28 DEGREES
VENTRICULAR RATE- MUSE: 60 BPM

## 2022-04-27 ENCOUNTER — TELEPHONE (OUTPATIENT)
Dept: CARDIOLOGY | Facility: CLINIC | Age: 52
End: 2022-04-27
Payer: OTHER GOVERNMENT

## 2022-04-27 NOTE — TELEPHONE ENCOUNTER
Called patient to complete Travel Screen for Cardiac Cath Lab appointment on 4/28 and inform patient of updated Visitor Policy.       No answer, no VM.   covid neg

## 2022-04-28 ENCOUNTER — APPOINTMENT (OUTPATIENT)
Dept: MEDSURG UNIT | Facility: CLINIC | Age: 52
End: 2022-04-28
Attending: INTERNAL MEDICINE
Payer: OTHER GOVERNMENT

## 2022-04-28 ENCOUNTER — APPOINTMENT (OUTPATIENT)
Dept: LAB | Facility: CLINIC | Age: 52
End: 2022-04-28
Attending: INTERNAL MEDICINE
Payer: OTHER GOVERNMENT

## 2022-04-28 ENCOUNTER — HOSPITAL ENCOUNTER (OUTPATIENT)
Facility: CLINIC | Age: 52
Discharge: HOME | End: 2022-04-29
Attending: INTERNAL MEDICINE | Admitting: INTERNAL MEDICINE
Payer: OTHER GOVERNMENT

## 2022-04-28 DIAGNOSIS — R00.0 TACHYCARDIA: ICD-10-CM

## 2022-04-28 LAB
ANION GAP SERPL CALCULATED.3IONS-SCNC: 4 MMOL/L (ref 3–14)
BUN SERPL-MCNC: 14 MG/DL (ref 7–30)
CALCIUM SERPL-MCNC: 9.3 MG/DL (ref 8.5–10.1)
CHLORIDE BLD-SCNC: 110 MMOL/L (ref 94–109)
CO2 SERPL-SCNC: 27 MMOL/L (ref 20–32)
CREAT SERPL-MCNC: 1.05 MG/DL (ref 0.66–1.25)
ERYTHROCYTE [DISTWIDTH] IN BLOOD BY AUTOMATED COUNT: 13.2 % (ref 10–15)
GFR SERPL CREATININE-BSD FRML MDRD: 84 ML/MIN/1.73M2
GLUCOSE BLD-MCNC: 104 MG/DL (ref 70–99)
HCT VFR BLD AUTO: 48.4 % (ref 40–53)
HGB BLD-MCNC: 16.4 G/DL (ref 13.3–17.7)
MCH RBC QN AUTO: 29.5 PG (ref 26.5–33)
MCHC RBC AUTO-ENTMCNC: 33.9 G/DL (ref 31.5–36.5)
MCV RBC AUTO: 87 FL (ref 78–100)
PLATELET # BLD AUTO: 191 10E3/UL (ref 150–450)
POTASSIUM BLD-SCNC: 4.2 MMOL/L (ref 3.4–5.3)
RBC # BLD AUTO: 5.56 10E6/UL (ref 4.4–5.9)
SODIUM SERPL-SCNC: 141 MMOL/L (ref 133–144)
WBC # BLD AUTO: 7.6 10E3/UL (ref 4–11)

## 2022-04-28 PROCEDURE — 999N000054 HC STATISTIC EKG NON-CHARGEABLE

## 2022-04-28 PROCEDURE — 80048 BASIC METABOLIC PNL TOTAL CA: CPT | Performed by: INTERNAL MEDICINE

## 2022-04-28 PROCEDURE — 93623 PRGRMD STIMJ&PACG IV RX NFS: CPT | Mod: 26 | Performed by: INTERNAL MEDICINE

## 2022-04-28 PROCEDURE — C1730 CATH, EP, 19 OR FEW ELECT: HCPCS | Performed by: INTERNAL MEDICINE

## 2022-04-28 PROCEDURE — C1732 CATH, EP, DIAG/ABL, 3D/VECT: HCPCS | Performed by: INTERNAL MEDICINE

## 2022-04-28 PROCEDURE — 85027 COMPLETE CBC AUTOMATED: CPT | Performed by: INTERNAL MEDICINE

## 2022-04-28 PROCEDURE — 258N000003 HC RX IP 258 OP 636: Performed by: INTERNAL MEDICINE

## 2022-04-28 PROCEDURE — 93010 ELECTROCARDIOGRAM REPORT: CPT | Mod: 59 | Performed by: INTERNAL MEDICINE

## 2022-04-28 PROCEDURE — 250N000011 HC RX IP 250 OP 636: Performed by: INTERNAL MEDICINE

## 2022-04-28 PROCEDURE — 93623 PRGRMD STIMJ&PACG IV RX NFS: CPT | Performed by: INTERNAL MEDICINE

## 2022-04-28 PROCEDURE — 93620 COMP EP EVL R AT VEN PAC&REC: CPT | Mod: 26 | Performed by: INTERNAL MEDICINE

## 2022-04-28 PROCEDURE — 93621 COMP EP EVL L PAC&REC C SINS: CPT

## 2022-04-28 PROCEDURE — C1894 INTRO/SHEATH, NON-LASER: HCPCS | Performed by: INTERNAL MEDICINE

## 2022-04-28 PROCEDURE — 272N000001 HC OR GENERAL SUPPLY STERILE: Performed by: INTERNAL MEDICINE

## 2022-04-28 PROCEDURE — 99153 MOD SED SAME PHYS/QHP EA: CPT | Performed by: INTERNAL MEDICINE

## 2022-04-28 PROCEDURE — 93620 COMP EP EVL R AT VEN PAC&REC: CPT | Performed by: INTERNAL MEDICINE

## 2022-04-28 PROCEDURE — 99152 MOD SED SAME PHYS/QHP 5/>YRS: CPT | Performed by: INTERNAL MEDICINE

## 2022-04-28 PROCEDURE — 999N000134 HC STATISTIC PP CARE STAGE 3

## 2022-04-28 PROCEDURE — 999N000142 HC STATISTIC PROCEDURE PREP ONLY

## 2022-04-28 PROCEDURE — 250N000013 HC RX MED GY IP 250 OP 250 PS 637: Performed by: NURSE PRACTITIONER

## 2022-04-28 PROCEDURE — 250N000009 HC RX 250: Performed by: INTERNAL MEDICINE

## 2022-04-28 PROCEDURE — 36415 COLL VENOUS BLD VENIPUNCTURE: CPT | Performed by: INTERNAL MEDICINE

## 2022-04-28 RX ORDER — SODIUM CHLORIDE 9 MG/ML
INJECTION, SOLUTION INTRAVENOUS CONTINUOUS
Status: DISCONTINUED | OUTPATIENT
Start: 2022-04-28 | End: 2022-04-28 | Stop reason: HOSPADM

## 2022-04-28 RX ORDER — FENTANYL CITRATE 50 UG/ML
INJECTION, SOLUTION INTRAMUSCULAR; INTRAVENOUS
Status: DISCONTINUED | OUTPATIENT
Start: 2022-04-28 | End: 2022-04-28 | Stop reason: HOSPADM

## 2022-04-28 RX ORDER — NALOXONE HYDROCHLORIDE 0.4 MG/ML
0.2 INJECTION, SOLUTION INTRAMUSCULAR; INTRAVENOUS; SUBCUTANEOUS
Status: DISCONTINUED | OUTPATIENT
Start: 2022-04-28 | End: 2022-04-29

## 2022-04-28 RX ORDER — NALOXONE HYDROCHLORIDE 0.4 MG/ML
0.4 INJECTION, SOLUTION INTRAMUSCULAR; INTRAVENOUS; SUBCUTANEOUS
Status: DISCONTINUED | OUTPATIENT
Start: 2022-04-28 | End: 2022-04-29

## 2022-04-28 RX ORDER — OXYCODONE AND ACETAMINOPHEN 5; 325 MG/1; MG/1
1 TABLET ORAL EVERY 4 HOURS PRN
Status: DISCONTINUED | OUTPATIENT
Start: 2022-04-28 | End: 2022-04-29 | Stop reason: HOSPADM

## 2022-04-28 RX ORDER — MIDAZOLAM HCL IN 0.9 % NACL/PF 1 MG/ML
.5-6 PLASTIC BAG, INJECTION (ML) INTRAVENOUS CONTINUOUS PRN
Status: DISCONTINUED | OUTPATIENT
Start: 2022-04-28 | End: 2022-04-28 | Stop reason: HOSPADM

## 2022-04-28 RX ORDER — LIDOCAINE 40 MG/G
CREAM TOPICAL
Status: DISCONTINUED | OUTPATIENT
Start: 2022-04-28 | End: 2022-04-28 | Stop reason: HOSPADM

## 2022-04-28 RX ORDER — ACETAMINOPHEN 500 MG
1000 TABLET ORAL ONCE
Status: COMPLETED | OUTPATIENT
Start: 2022-04-28 | End: 2022-04-28

## 2022-04-28 RX ADMIN — ACETAMINOPHEN 1000 MG: 500 TABLET ORAL at 12:39

## 2022-04-28 RX ADMIN — Medication 3 MG/HR: at 08:36

## 2022-04-28 RX ADMIN — SODIUM CHLORIDE: 9 INJECTION, SOLUTION INTRAVENOUS at 07:44

## 2022-04-28 ASSESSMENT — COLUMBIA-SUICIDE SEVERITY RATING SCALE - C-SSRS
4. HAVE YOU HAD THESE THOUGHTS AND HAD SOME INTENTION OF ACTING ON THEM?: NO
1. IN THE PAST MONTH, HAVE YOU WISHED YOU WERE DEAD OR WISHED YOU COULD GO TO SLEEP AND NOT WAKE UP?: NO
3. HAVE YOU BEEN THINKING ABOUT HOW YOU MIGHT KILL YOURSELF?: NO
2. HAVE YOU ACTUALLY HAD ANY THOUGHTS OF KILLING YOURSELF IN THE PAST MONTH?: NO
5. HAVE YOU STARTED TO WORK OUT OR WORKED OUT THE DETAILS OF HOW TO KILL YOURSELF? DO YOU INTEND TO CARRY OUT THIS PLAN?: NO
6. HAVE YOU EVER DONE ANYTHING, STARTED TO DO ANYTHING, OR PREPARED TO DO ANYTHING TO END YOUR LIFE?: NO

## 2022-04-28 NOTE — PROGRESS NOTES
Arrived to [address] for SVT ablation in EP lab. AFVSS. Denies pain. Consent done. ECG done. Bilat pp & pt pulses palpable & marked. Wife, Alie, at bedside. Pt stable.

## 2022-04-28 NOTE — PROGRESS NOTES
Pt arrived on 3c post attempted heart ablation. Right groin f/di. No pain. Pt sipping on water. Plan is for admission to .

## 2022-04-28 NOTE — DISCHARGE INSTRUCTIONS
Post-Ablation Discharge Instructions    Care of groin site:        Remove the Band-Aid after 24 hours. If there is minor oozing, apply another Band-aid and remove it after 12 hours.         Do NOT take a bath, use a hot tub, pool, or submerse in water for at least 3 days You may shower.         It is normal to have a small bruise or lump at the site.        Do not scrub the site.        Do not use lotion or powder near the puncture site for 3 days.        For the first 2 days: Do not stoop or squat. When you cough, sneeze or move your bowels, hold your hand over the puncture site and press gently.        Do not lift more than 10 pounds or exertional activity for 10 days.      If you start bleeding from the site in your groin:  Lie down flat and press firmly on the site.  Call your physician immediately, or, come to the emergency room.    Call 911 right away if you have bleeding that is heavy or does not stop.     Call your doctor/provider if:        You have a large or growing hard lump around the site.        The site is red, swollen, hot or tender.        Blood or fluid is draining from the site.        You have chills or a fever greater than 101 F (38 C).        Your leg or arm turns bluish, feels numb or cool.        You have hives, a rash or unusual itching.     Cardiovascular Clinic:   [address] [place] [address]. [place] [address]  Your Care Team:  EP Cardiology   Telephone Number     Karishma Fuller [phone]   Estephanie Palma RN  [phone]     For scheduling appts or procedures:    Hilda Richardson   [phone]   For the Device Clinic (Pacemakers and ICD's)   RN's :   Richelle Levine During business hours: [phone]     After business hours:   [phone]- select option 4 and ask for job code [id number].       As always, Thank you for trusting us with your health care needs

## 2022-04-28 NOTE — PROGRESS NOTES
Pt tolerated bedrest without complication. Taking oral intake. Ambulated and voided. Family at bedside. Report given to Rocío MELÉNDEZ on 6c and Medina Meléndez on 3c for continuation of care.

## 2022-04-28 NOTE — PLAN OF CARE
Transfer        6:00 pm  ---------------------------------------------------------------------  Transferred to/from: 3C  Via: Bed  Reason for transfer: Overnight observation  Family: Yes, wife at bedsite.  Belongings: Sent with pt  Chart: Sent with pt  Medications: Meds from bin sent with pt  Report called from: QUENTIN Haney.    Temp:  [97.7  F (36.5  C)-98.1  F (36.7  C)] 97.7  F (36.5  C)  Pulse:  [59-85] 61  Resp:  [14-20] 16  BP: (111-138)/() 122/74  SpO2:  [94 %-99 %] 97 %

## 2022-04-28 NOTE — PROGRESS NOTES
Transferred pt to 6418-2. QUENTIN Magana was in the room and handoff report was given. Right groin site C/D/I, negative for a hematoma. Wife in attendance.

## 2022-04-29 ENCOUNTER — MYC MEDICAL ADVICE (OUTPATIENT)
Dept: CARDIOLOGY | Facility: CLINIC | Age: 52
End: 2022-04-29

## 2022-04-29 ENCOUNTER — APPOINTMENT (OUTPATIENT)
Dept: CARDIOLOGY | Facility: CLINIC | Age: 52
End: 2022-04-29
Attending: INTERNAL MEDICINE
Payer: OTHER GOVERNMENT

## 2022-04-29 VITALS
RESPIRATION RATE: 20 BRPM | WEIGHT: 248.5 LBS | BODY MASS INDEX: 34.79 KG/M2 | TEMPERATURE: 98.4 F | DIASTOLIC BLOOD PRESSURE: 83 MMHG | OXYGEN SATURATION: 96 % | SYSTOLIC BLOOD PRESSURE: 144 MMHG | HEART RATE: 59 BPM | HEIGHT: 71 IN

## 2022-04-29 LAB
ATRIAL RATE - MUSE: 51 BPM
DIASTOLIC BLOOD PRESSURE - MUSE: NORMAL MMHG
INTERPRETATION ECG - MUSE: NORMAL
P AXIS - MUSE: 45 DEGREES
PR INTERVAL - MUSE: 166 MS
QRS DURATION - MUSE: 88 MS
QT - MUSE: 426 MS
QTC - MUSE: 392 MS
R AXIS - MUSE: 38 DEGREES
SYSTOLIC BLOOD PRESSURE - MUSE: NORMAL MMHG
T AXIS - MUSE: 20 DEGREES
VENTRICULAR RATE- MUSE: 51 BPM

## 2022-04-29 PROCEDURE — 99217 PR OBSERVATION CARE DISCHARGE: CPT | Performed by: INTERNAL MEDICINE

## 2022-04-29 PROCEDURE — 93225 XTRNL ECG REC<48 HRS REC: CPT

## 2022-04-29 NOTE — PLAN OF CARE
Neuro: A/Ox4.  Cardiac: SB/SR with HR 50-70's. VSS. No SVT noted.   Respiratory: O2 sats stable on RA  GI/: Voiding in BR. No BM overnight.  Diet/appetite: Good po  Activity:  UAL  Pain: Denies pain.  Skin: R groin site soft, no hematoma. CMS intact.   LDA's: PIV sl'd    Plan: Probable discharge today. Continue with POC. Notify primary team with changes.

## 2022-04-29 NOTE — DISCHARGE SUMMARY
[facility]    Cardiology Discharge Summary- Electrophysiology         Date of Admission:  4/28/2022  Date of Discharge:  4/29/2022  Discharging Provider: Dr. Fuller      Discharge Diagnoses   SVT    Follow-ups Needed After Discharge   Follow-up Appointments     Adult [facility] Follow-up and recommended labs and tests      Follow up with Dr. Fuller , at [facility] in 1 month.    Appointments on [place] and/or [facility] (with [facility] or [facility]   provider or service). Call [phone] if you haven't heard regarding   these appointments within 7 days of discharge.           Unresulted Labs Ordered in the Past 30 Days of this Admission     No orders found for last 31 day(s).          Discharge Disposition   Discharged to home  Condition at discharge: Stable    Hospital Course   Arrived for an outpatient SVT ablation yesterday. We were unable to induce SVT during EP study. Monitored him under observation overnight as he has daily, nocturnal tachycardia episodes that have been noted on his ILR. Unfortunately he did not have any overnight as he did not rest well. 48 Hour Holter monitor will be placed prior to discharge.    Consultations This Hospital Stay   ADVANCE DIRECTIVE IP CONSULT    Code Status   Full Code        Zoltan Encarnacion MD  [facility]    Addendum:  I saw and evaluated the patient and agree with the fellow s finding and plans as written.  Hunter Fuller MD  ______________________________________________________________________    Physical Exam   Vital Signs: Temp: 98.4  F (36.9  C) Temp src: Oral BP: (!) 144/83 Pulse: 59   Resp: 20 SpO2: 96 %   Oxygen Delivery: 1 LPM  Weight: 248 lbs 8 oz  General Appearance: NAD  Respiratory: CTAB  Cardiovascular: RRR, no M/R/G  GI: soft, NT/ND  Extremities:  No LE edema         Primary Care Physician   Irvin Blake    Discharge Orders      Reason for your hospital stay    SVT      Activity    Your activity upon discharge: activity as tolerated     Adult [facility] Follow-up and recommended labs and tests    Follow up with Dr. Fuller , at [facility] in 1 month.    Appointments on [place] and/or [facility] (with [facility] or [facility] provider or service). Call [phone] if you haven't heard regarding these appointments within 7 days of discharge.     Diet    Follow this diet upon discharge: Orders Placed This Encounter      Regular Diet Adult       Significant Results and Procedures   Not inducible SVT    Discharge Medications   Current Discharge Medication List      CONTINUE these medications which have NOT CHANGED    Details   multivitamin, therapeutic with minerals (THERA-VIT-M) TABS Take 1 tablet by mouth daily           Allergies   Allergies   Allergen Reactions     Diphenhydramine Other (See Comments) and Unknown     VERY SENSITIVE TO IT.  Makes goofy  VERY SENSITIVE TO IT.  Makes goofy  Makes goofy  VERY SENSITIVE TO IT.  VERY SENSITIVE TO IT.  Makes goofy       Epinephrine Other (See Comments)     Other reaction(s): Tachycardia  B/P  Issues. Pt is unsure if he becomes hypotensive or hypertensive.   B/P  issues       Prednisone Other (See Comments)     In high doses. Became nauseated and confused also.     Sulfamethoxazole W/Trimethoprim Anxiety and Rash     Mild itching with red bumpy rash and also made me real anxious  Mild itching with red bumpy rash and also made me real anxious

## 2022-04-29 NOTE — PLAN OF CARE
DISCHARGE   Discharged to: Home  Via: Automobile  Accompanied by: Family - wife and son  Discharge Instructions: diet, activity, medications, follow up appointments, when to call the MD, and what to watchout for (i.e. s/s of infection, increasing SOB, palpitations, chest pain,)  Prescriptions: No new medications  Follow Up Appointments: arranged; information given  Belongings: All sent with pt  IV: out  Telemetry: off  Pt exhibits understanding of above discharge instructions; all questions answered.  Discharge Paperwork: faxed    Pt alert and oriented x4. VSS. No VT this morning or overnight. Holter monitor ordered and placed on pt prior to discharge. Discharge education done at the bedside with pt and wife. All questions answered and addressed. All personal belongings packed and ready to go. Pt ambulated down with RN and family to front [facility].

## 2022-04-29 NOTE — PLAN OF CARE
Time of care [id number].    D: SVT ablation - unsuccessful.   I/A: A&Ox4, Endorses no pain. Up ad meghann. No tachycardia on tele noted from [id number]. Pt able to make needs known. Writer was informed pt has runs of SVT during the night. During ablation today (4/28) doctors tried to induce SVT but weren't able to. Pt to stay the night to monitor. R groin site WDL.     Continue to monitor and page EP if any change in pt status.

## 2022-05-02 LAB
ATRIAL RATE - MUSE: 64 BPM
DIASTOLIC BLOOD PRESSURE - MUSE: NORMAL MMHG
INTERPRETATION ECG - MUSE: NORMAL
P AXIS - MUSE: 36 DEGREES
PR INTERVAL - MUSE: 182 MS
QRS DURATION - MUSE: 88 MS
QT - MUSE: 420 MS
QTC - MUSE: 433 MS
R AXIS - MUSE: 39 DEGREES
SYSTOLIC BLOOD PRESSURE - MUSE: NORMAL MMHG
T AXIS - MUSE: 13 DEGREES
VENTRICULAR RATE- MUSE: 64 BPM

## 2022-05-04 ENCOUNTER — MYC MEDICAL ADVICE (OUTPATIENT)
Dept: CARDIOLOGY | Facility: CLINIC | Age: 52
End: 2022-05-04
Payer: OTHER GOVERNMENT

## 2022-05-04 DIAGNOSIS — R00.0 TACHYCARDIA: Primary | ICD-10-CM

## 2022-05-04 NOTE — TELEPHONE ENCOUNTER
[facility] Call Center    Phone Message    May a detailed message be left on voicemail: yes     Reason for Call: Other: Malcolm would like a call back to discuss the monitor and setting up an appt as soon as tomorrow if possible and then going forward. Please call to discuss Thank you     Action Taken: Other: cardio    Travel Screening: Not Applicable

## 2022-05-04 NOTE — TELEPHONE ENCOUNTER
[address] message sent to patient. 12 lead holter ordered.   Separate message sent to billing with request to not charge for both holters.     Called patient and offered to find a local cardiology clinic that may have 12 lead holter. He prefers to come to [place] and stay overnight a couple of nights to wear and return the holter. Hook up scheduled for 5/5/22 at 3pm. 12 lead holter available & set aside. EMTs notified.      Hunter Fuller MD  You 38 minutes ago (2:13 PM)     TY    I have reviewed it and he did have a couple of SVT episodes.   I asked the EP fellow to place 12-lead Holter ECG.   But it does not look like 12-lead ECG.   Is 12-lead Holter available?   If so, please ask him to wear it. Otherwise, I cannot figure out what is going on.   Thank you so much.   Hunter Fuller

## 2022-05-05 ENCOUNTER — ANCILLARY PROCEDURE (OUTPATIENT)
Dept: CARDIOLOGY | Facility: CLINIC | Age: 52
End: 2022-05-05
Attending: INTERNAL MEDICINE
Payer: OTHER GOVERNMENT

## 2022-05-05 DIAGNOSIS — R00.0 TACHYCARDIA: ICD-10-CM

## 2022-05-05 PROCEDURE — 93225 XTRNL ECG REC<48 HRS REC: CPT

## 2022-05-05 NOTE — PROGRESS NOTES
Per Hunter Vargas patient to have 48 hour holter monitor placed.  Diagnosis: Tachycardia R00.0  Monitor placed: Yes  Patient Instructed: Yes  Patient verbalized understanding: Yes  Holter # 12 lead holter  Placed by Gautam Melchor

## 2022-05-05 NOTE — TELEPHONE ENCOUNTER
Pt to have 12 lead 48 hr holter placed on 5/5.   Will sign this encounter and start a new one with results of new monitor.   Mena Correa RN on 5/5/2022 at 1:30 PM

## 2022-05-09 ENCOUNTER — MYC MEDICAL ADVICE (OUTPATIENT)
Dept: CARDIOLOGY | Facility: CLINIC | Age: 52
End: 2022-05-09
Payer: OTHER GOVERNMENT

## 2022-05-16 ENCOUNTER — ANCILLARY PROCEDURE (OUTPATIENT)
Dept: CARDIOLOGY | Facility: CLINIC | Age: 52
End: 2022-05-16
Attending: INTERNAL MEDICINE
Payer: OTHER GOVERNMENT

## 2022-05-16 ENCOUNTER — MYC MEDICAL ADVICE (OUTPATIENT)
Dept: CARDIOLOGY | Facility: CLINIC | Age: 52
End: 2022-05-16

## 2022-05-16 VITALS
WEIGHT: 251 LBS | HEIGHT: 71 IN | OXYGEN SATURATION: 96 % | BODY MASS INDEX: 35.14 KG/M2 | DIASTOLIC BLOOD PRESSURE: 86 MMHG | SYSTOLIC BLOOD PRESSURE: 137 MMHG | HEART RATE: 68 BPM

## 2022-05-16 DIAGNOSIS — R00.0 TACHYCARDIA: ICD-10-CM

## 2022-05-16 DIAGNOSIS — R00.1 BRADYCARDIA: ICD-10-CM

## 2022-05-16 PROCEDURE — 99215 OFFICE O/P EST HI 40 MIN: CPT | Mod: 25 | Performed by: INTERNAL MEDICINE

## 2022-05-16 PROCEDURE — 93227 XTRNL ECG REC<48 HR R&I: CPT | Performed by: INTERNAL MEDICINE

## 2022-05-16 PROCEDURE — G0463 HOSPITAL OUTPT CLINIC VISIT: HCPCS

## 2022-05-16 PROCEDURE — 93225 XTRNL ECG REC<48 HRS REC: CPT

## 2022-05-16 RX ORDER — UBIDECARENONE 100 MG
100 CAPSULE ORAL DAILY
COMMUNITY
End: 2022-05-25

## 2022-05-16 RX ORDER — TURMERIC/TURMERIC EXT/PEPR EXT 900-100 MG
1 CAPSULE ORAL DAILY
COMMUNITY
End: 2022-05-25

## 2022-05-16 ASSESSMENT — PAIN SCALES - GENERAL: PAINLEVEL: NO PAIN (0)

## 2022-05-16 NOTE — PROGRESS NOTES
HPI:   Malcolm Rosa is a 51 yo male with PMH of HTN, HLD, fatty liver, GERD, chronic chest pain, CY, and nocturnal tachycardia s/p ILR placement.  He was referred for a cardiology evaluation.  He loretta been suffering from tachycardia during sleep, which presented with a sudden onset and gradual offset. It woke him up a couple of times every night with an hour interval. The first one usually occurred at 11PM-1AM.  He has had no tachycardia during daytime.  He reported that Versed cold induce his tachycardia.   He changed multiple things in his daily life, but nothing affected his symptoms.  He underwent EP study on 4/28/2022 when no SVT was induced.  He was then admitted to the hospital to monitor overnight but had no events.  He then wore Holter ECG and returned to the clinic today.      PAST MEDICAL HISTORY:  Past Medical History:   Diagnosis Date     Abnormal laboratory test     slightly elevated glucagon     Benign essential hypertension      Chronic fatigue syndrome      Epistaxis      Fatty liver      Gynecomastia      Herniated cervical disc without myelopathy      Hyperglycemia      Hypophosphatemia      Insomnia      Left varicocele      Prostatic hypertrophy      Pure hypercholesterolemia        CURRENT MEDICATIONS:  Current Outpatient Medications   Medication Sig Dispense Refill     Black Pepper-Turmeric (TURMERIC CURCUMIN) 5-1000 MG CAPS Take 1 capsule by mouth daily       co-enzyme Q-10 100 MG CAPS capsule Take 100 mg by mouth daily       Magnesium 400 MG CAPS Take 1 tablet by mouth daily       multivitamin, therapeutic with minerals (THERA-VIT-M) TABS Take 1 tablet by mouth daily         PAST SURGICAL HISTORY:  Past Surgical History:   Procedure Laterality Date     ARTHROSCOPY KNEE BILATERAL       CHOLECYSTECTOMY  11/13/15     EP ABLATION SVT N/A 4/28/2022    Procedure: Ablation Supraventricular Tachycardia [[id number]];  Surgeon: Hunter Fuller MD;  Location:  HEART CARDIAC CATH LAB     FUSION  "CERVICAL ANTERIOR ONE LEVEL      C6-7     HERNIA REPAIR Bilateral     inguinal and umbilical     VARICOCELECTOMY Left      VASECTOMY         ALLERGIES:     Allergies   Allergen Reactions     Diphenhydramine Other (See Comments) and Unknown     VERY SENSITIVE TO IT.  Makes goofy  VERY SENSITIVE TO IT.  Makes goofy  Makes goofy  VERY SENSITIVE TO IT.  VERY SENSITIVE TO IT.  Makes goofy       Epinephrine Other (See Comments)     Other reaction(s): Tachycardia  B/P  Issues. Pt is unsure if he becomes hypotensive or hypertensive.   B/P  issues       Prednisone Other (See Comments)     In high doses. Became nauseated and confused also.     Sulfamethoxazole W/Trimethoprim Anxiety and Rash     Mild itching with red bumpy rash and also made me real anxious  Mild itching with red bumpy rash and also made me real anxious         FAMILY HISTORY:  + Premature coronary artery disease  - Atrial fibrillation  + Sudden cardiac death     SOCIAL HISTORY:  Social History     Tobacco Use     Smoking status: Never Smoker     Smokeless tobacco: Never Used       ROS:   Constitutional: No fever, chills, or sweats. Weight stable.   ENT: No visual disturbance, ear ache, epistaxis, sore throat.   Cardiovascular: As per HPI.   Respiratory: No cough, hemoptysis.    GI: No nausea, vomiting, hematemesis, melena, or hematochezia.   : No hematuria.   Integument: Negative.   Psychiatric: Negative.   Hematologic:  Easy bruising, no easy bleeding.  Neuro: Negative.   Endocrinology: No significant heat or cold intolerance   Musculoskeletal: No myalgia.    Exam:  /86 (BP Location: Right arm, Patient Position: Chair, Cuff Size: Adult Regular)   Pulse 68   Ht 1.803 m (5' 11\")   Wt 113.9 kg (251 lb)   SpO2 96%   BMI 35.01 kg/m    GENERAL APPEARANCE: healthy, alert and no distress  HEENT: no icterus, no xanthelasmas, normal pupil size and reaction, normal palate, mucosa moist, no central cyanosis  NECK: no adenopathy, no asymmetry, masses, or " scars, thyroid normal to palpation and no bruits, JVP not elevated  RESPIRATORY: lungs clear to auscultation - no rales, rhonchi or wheezes, no use of accessory muscles, no retractions, respirations are unlabored, normal respiratory rate  CARDIOVASCULAR: regular rhythm, normal S1 with physiologic split S2, no S3 or S4 and no murmur, click or rub, precordium quiet with normal PMI.  ABDOMEN: soft, non tender, without hepatosplenomegaly, no masses palpable, bowel sounds normal, aorta not enlarged by palpation, no abdominal bruits  EXTREMITIES: peripheral pulses normal, no edema, no bruits  NEURO: alert and oriented to person/place/time, normal speech, gait and affect  VASC: Radial, femoral, dorsalis pedis and posterior tibialis pulses are normal in volumes and symmetric bilaterally. No bruits are heard.  SKIN: no ecchymoses, no rashes    Labs:  CBC RESULTS:   Lab Results   Component Value Date    WBC 7.6 04/28/2022    RBC 5.56 04/28/2022    HGB 16.4 04/28/2022    HCT 48.4 04/28/2022    MCV 87 04/28/2022    MCH 29.5 04/28/2022    MCHC 33.9 04/28/2022    RDW 13.2 04/28/2022     04/28/2022       BMP RESULTS:  Lab Results   Component Value Date     04/28/2022    POTASSIUM 4.2 04/28/2022    CHLORIDE 110 (H) 04/28/2022    CO2 27 04/28/2022    ANIONGAP 4 04/28/2022     (H) 04/28/2022    BUN 14 04/28/2022    CR 1.05 04/28/2022    GFRESTIMATED 84 04/28/2022    XOCHITL 9.3 04/28/2022        INR RESULTS:  No results found for: INR    Procedures:  ECG on 4/25/2022: Reviewed.  WNL.    3/14/2022 lexiscan WNL  3/20/22 Echo WNL  ILR checks 1/17/22 - reports NSR but then a faster episode, 4/19/22 arminda at night, 1 symptom event (in care everywhere)    ILR interrogation on 4/22/2022: Reviewed.  Remote Impression and Plan   Place of Service Home;ILR;Alert   Final Impression Normal Remote Monitor with Events   Implanting Physician Dr. Albarado   EF 55   EF Tests Echo   EF Test DOS 10/27/21   Events/Comments Alert for  2 Tachy and 2 symptom activated events (BSCLUX)Tachy episodes T-7,8 occured 4/21/22 between 13:20 and 13:29 with v rates 143-146bpm, duration 18-19seconds, supports ST, Symptom PT-50 occured while in bed, c/o heart racing occured 4/22/22 @00:22 supports ST @ 110bpm, PT-49 occured 4/21/22 @01:22 while in bed, c/o heart racing egm supports SR @ 60-62bpm   In Basket Sent to Víctor Manrique PA-C   Follow Up Plan follow-up as scheduled;1 month remote monitor   Plan of Care Full report under Media/CV tab   Anticoagulation No   Battery OK   Heart Rate Histograms Adequate histograms   Non-sustained 2   E-grams Support (appears to be) Sinus Tachycardia   Presenting Rhythm VS   Presenting Heart Rate 79   Indication VT           Holter ECG in 4/2022: Reviewed.        ILR interrogation on 5/16/2022: Reviewed.            Assessment and Plan:   # HTN  # HLD  # Fatty liver  # GERD  # Chronic chest pain  # CY  # Normal lexiscan on 3/14/2022.   # Normal TTE on 3/20/22  # s/p ILR placement.  # Nocturnal tachycardia -> SVT. No specific triggers. Induced by Versed.  His SVT is likely to be atrial tachycardia vs sinus tachycardia.  He underwent EP study on 4/28/2022 when no SVT was induced.  He was then admitted to the hospital to monitor overnight but had no events.  He then wore Holter ECG which suggested that the tachycardia was more likely to be sinus tachycardia rather than atrial tachycardia because he woke up (artifact was recorded) first before the tachycardia started and a short HI interval during the tachycardia and gradual offset of the tachycardia were noted.  At this point, I would think that he is most likely to be having night terror.  He reported that taking Mg and CoQ10 might prevent those events but they could have been a placebo.  To rule out atrial tachycardia, I will place him on a 12-lead Holter ECG.  If his tachycardia is sinus tachycardia, I will put him on BZ etc for a diagnostic treatment.    I spent a total of  40 min today to review the records, see the patient, and complete the documents.    CC  Patient Care Team:  Dee Damon MD as PCP - General  Min Queen MD as Referring Physician  Phillip Blanco MD as MD (Dermapathology)  Hunter Fuller MD (Cardiovascular Disease)  Mena Correa, RN as Specialty Care Coordinator  DEE DAMON

## 2022-05-16 NOTE — LETTER
5/16/2022      RE: Malcolm Rosa  [address] [place] [id number]       Dear Colleague,    Thank you for the opportunity to participate in the care of your patient, Malcolm Rosa, at the [facility]. Please see a copy of my visit note below.    HPI:   Malcolm Rosa is a 53 yo male with PMH of HTN, HLD, fatty liver, GERD, chronic chest pain, CY, and nocturnal tachycardia s/p ILR placement.  He was referred for a cardiology evaluation.  He loretta been suffering from tachycardia during sleep, which presented with a sudden onset and gradual offset. It woke him up a couple of times every night with an hour interval. The first one usually occurred at 11PM-1AM.  He has had no tachycardia during daytime.  He reported that Versed cold induce his tachycardia.   He changed multiple things in his daily life, but nothing affected his symptoms.  He underwent EP study on 4/28/2022 when no SVT was induced.  He was then admitted to the hospital to monitor overnight but had no events.  He then wore Holter ECG and returned to the clinic today.      PAST MEDICAL HISTORY:  Past Medical History:   Diagnosis Date     Abnormal laboratory test     slightly elevated glucagon     Benign essential hypertension      Chronic fatigue syndrome      Epistaxis      Fatty liver      Gynecomastia      Herniated cervical disc without myelopathy      Hyperglycemia      Hypophosphatemia      Insomnia      Left varicocele      Prostatic hypertrophy      Pure hypercholesterolemia        CURRENT MEDICATIONS:  Current Outpatient Medications   Medication Sig Dispense Refill     Black Pepper-Turmeric (TURMERIC CURCUMIN) 5-1000 MG CAPS Take 1 capsule by mouth daily       co-enzyme Q-10 100 MG CAPS capsule Take 100 mg by mouth daily       Magnesium 400 MG CAPS Take 1 tablet by mouth daily       multivitamin, therapeutic with minerals (THERA-VIT-M) TABS Take 1  tablet by mouth daily         PAST SURGICAL HISTORY:  Past Surgical History:   Procedure Laterality Date     ARTHROSCOPY KNEE BILATERAL       CHOLECYSTECTOMY  11/13/15     EP ABLATION SVT N/A 4/28/2022    Procedure: Ablation Supraventricular Tachycardia [[id number]];  Surgeon: Hunter Fuller MD;  Location:  HEART CARDIAC CATH LAB     FUSION CERVICAL ANTERIOR ONE LEVEL      C6-7     HERNIA REPAIR Bilateral     inguinal and umbilical     VARICOCELECTOMY Left      VASECTOMY         ALLERGIES:     Allergies   Allergen Reactions     Diphenhydramine Other (See Comments) and Unknown     VERY SENSITIVE TO IT.  Makes goofy  VERY SENSITIVE TO IT.  Makes goofy  Makes goofy  VERY SENSITIVE TO IT.  VERY SENSITIVE TO IT.  Makes goofy       Epinephrine Other (See Comments)     Other reaction(s): Tachycardia  B/P  Issues. Pt is unsure if he becomes hypotensive or hypertensive.   B/P  issues       Prednisone Other (See Comments)     In high doses. Became nauseated and confused also.     Sulfamethoxazole W/Trimethoprim Anxiety and Rash     Mild itching with red bumpy rash and also made me real anxious  Mild itching with red bumpy rash and also made me real anxious         FAMILY HISTORY:  + Premature coronary artery disease  - Atrial fibrillation  + Sudden cardiac death     SOCIAL HISTORY:  Social History     Tobacco Use     Smoking status: Never Smoker     Smokeless tobacco: Never Used       ROS:   Constitutional: No fever, chills, or sweats. Weight stable.   ENT: No visual disturbance, ear ache, epistaxis, sore throat.   Cardiovascular: As per HPI.   Respiratory: No cough, hemoptysis.    GI: No nausea, vomiting, hematemesis, melena, or hematochezia.   : No hematuria.   Integument: Negative.   Psychiatric: Negative.   Hematologic:  Easy bruising, no easy bleeding.  Neuro: Negative.   Endocrinology: No significant heat or cold intolerance   Musculoskeletal: No myalgia.    Exam:  /86 (BP Location: Right arm, Patient Position:  "Chair, Cuff Size: Adult Regular)   Pulse 68   Ht 1.803 m (5' 11\")   Wt 113.9 kg (251 lb)   SpO2 96%   BMI 35.01 kg/m    GENERAL APPEARANCE: healthy, alert and no distress  HEENT: no icterus, no xanthelasmas, normal pupil size and reaction, normal palate, mucosa moist, no central cyanosis  NECK: no adenopathy, no asymmetry, masses, or scars, thyroid normal to palpation and no bruits, JVP not elevated  RESPIRATORY: lungs clear to auscultation - no rales, rhonchi or wheezes, no use of accessory muscles, no retractions, respirations are unlabored, normal respiratory rate  CARDIOVASCULAR: regular rhythm, normal S1 with physiologic split S2, no S3 or S4 and no murmur, click or rub, precordium quiet with normal PMI.  ABDOMEN: soft, non tender, without hepatosplenomegaly, no masses palpable, bowel sounds normal, aorta not enlarged by palpation, no abdominal bruits  EXTREMITIES: peripheral pulses normal, no edema, no bruits  NEURO: alert and oriented to person/place/time, normal speech, gait and affect  VASC: Radial, femoral, dorsalis pedis and posterior tibialis pulses are normal in volumes and symmetric bilaterally. No bruits are heard.  SKIN: no ecchymoses, no rashes    Labs:  CBC RESULTS:   Lab Results   Component Value Date    WBC 7.6 04/28/2022    RBC 5.56 04/28/2022    HGB 16.4 04/28/2022    HCT 48.4 04/28/2022    MCV 87 04/28/2022    MCH 29.5 04/28/2022    MCHC 33.9 04/28/2022    RDW 13.2 04/28/2022     04/28/2022       BMP RESULTS:  Lab Results   Component Value Date     04/28/2022    POTASSIUM 4.2 04/28/2022    CHLORIDE 110 (H) 04/28/2022    CO2 27 04/28/2022    ANIONGAP 4 04/28/2022     (H) 04/28/2022    BUN 14 04/28/2022    CR 1.05 04/28/2022    GFRESTIMATED 84 04/28/2022    XOCHITL 9.3 04/28/2022        INR RESULTS:  No results found for: INR    Procedures:  ECG on 4/25/2022: Reviewed.  WNL.    3/14/2022 lexiscan WNL  3/20/22 Echo WNL  ILR checks 1/17/22 - reports NSR but then a faster " episode, 4/19/22 arminda at night, 1 symptom event (in care everywhere)    ILR interrogation on 4/22/2022: Reviewed.  Remote Impression and Plan   Place of Service Home;ILR;Alert   Final Impression Normal Remote Monitor with Events   Implanting Physician Dr. Albarado   EF 55   EF Tests Echo   EF Test DOS 10/27/21   Events/Comments Alert for 2 Tachy and 2 symptom activated events (BSCLUX)Tachy episodes T-7,8 occured 4/21/22 between 13:20 and 13:29 with v rates 143-146bpm, duration 18-19seconds, supports ST, Symptom PT-50 occured while in bed, c/o heart racing occured 4/22/22 @00:22 supports ST @ 110bpm, PT-49 occured 4/21/22 @01:22 while in bed, c/o heart racing egm supports SR @ 60-62bpm   In Basket Sent to Víctor Manrique PA-C   Follow Up Plan follow-up as scheduled;1 month remote monitor   Plan of Care Full report under Media/CV tab   Anticoagulation No   Battery OK   Heart Rate Histograms Adequate histograms   Non-sustained 2   E-grams Support (appears to be) Sinus Tachycardia   Presenting Rhythm VS   Presenting Heart Rate 79   Indication VT           Holter ECG in 4/2022: Reviewed.        ILR interrogation on 5/16/2022: Reviewed.            Assessment and Plan:   # HTN  # HLD  # Fatty liver  # GERD  # Chronic chest pain  # CY  # Normal lexiscan on 3/14/2022.   # Normal TTE on 3/20/22  # s/p ILR placement.  # Nocturnal tachycardia -> SVT. No specific triggers. Induced by Versed.  His SVT is likely to be atrial tachycardia vs sinus tachycardia.  He underwent EP study on 4/28/2022 when no SVT was induced.  He was then admitted to the hospital to monitor overnight but had no events.  He then wore Holter ECG which suggested that the tachycardia was more likely to be sinus tachycardia rather than atrial tachycardia because he woke up (artifact was recorded) first before the tachycardia started and a short AL interval during the tachycardia and gradual offset of the tachycardia were noted.  At this point, I would  think that he is most likely to be having night terror.  He reported that taking Mg and CoQ10 might prevent those events but they could have been a placebo.  To rule out atrial tachycardia, I will place him on a 12-lead Holter ECG.  If his tachycardia is sinus tachycardia, I will put him on BZ etc for a diagnostic treatment.    I spent a total of 40 min today to review the records, see the patient, and complete the documents.      Please do not hesitate to contact me if you have any questions/concerns.     Sincerely,     Hunter Fuller MD      CC  Patient Care Team:  Irvin Blake MD as PCP - General  Bret, Min BABIN MD as Referring Physician  Phillip Blanco MD as MD (Dermapathology)  Mena Correa, RN as Specialty Care Coordinator

## 2022-05-16 NOTE — PROGRESS NOTES
Per Dr. Fuller, patient to have 48 Hour 12 lead holter monitor placed.  Diagnosis: Tachycardia  Monitor placed: Yes  Patient Instructed: Yes  Patient verbalized understanding: Yes  Holter # 12 lead holter monitor

## 2022-05-16 NOTE — NURSING NOTE
Chief Complaint   Patient presents with     Follow Up     Return EP- 1 mo f/u for tachycardia, has ILR     Vitals were taken and medications reconciled.    STORMY Cordova  2:28 PM

## 2022-05-16 NOTE — PATIENT INSTRUCTIONS
You were seen in the Electrophysiology Clinic today by: Dr Fuller    Plan:     Labs/Tests Needed:  12 lead 48 hour holter monitor    Follow up visit:  To be determined based on results- we will contact you        Your Care Team:  EP Cardiology   Telephone Number     Nurse Line  Mena Correa RN  [phone]     For scheduling appts or procedures:    Hilda Richardson   [phone]   For the Device Clinic (Pacemakers, ICDs, Loop Recorders)    During business hours: [phone]  After business hours:   [phone]- select option 4 and ask for job code [id number].     On-call cardiologist for after hours or on weekends: [phone], option #4, and ask to speak to the on-call cardiologist.     Cardiovascular Clinic:   [address] [place] [address]. [place] [address]      As always, Thank you for trusting us with your health care needs!

## 2022-05-20 ENCOUNTER — MYC MEDICAL ADVICE (OUTPATIENT)
Dept: CARDIOLOGY | Facility: CLINIC | Age: 52
End: 2022-05-20
Payer: OTHER GOVERNMENT

## 2022-05-20 DIAGNOSIS — Z86.59 HISTORY OF NIGHT TERRORS: ICD-10-CM

## 2022-05-20 DIAGNOSIS — F51.4 ADULT NIGHT TERROR: Primary | ICD-10-CM

## 2022-05-25 ENCOUNTER — MYC MEDICAL ADVICE (OUTPATIENT)
Dept: CARDIOLOGY | Facility: CLINIC | Age: 52
End: 2022-05-25
Payer: OTHER GOVERNMENT

## 2022-06-10 NOTE — TELEPHONE ENCOUNTER
I reviewed his 12 lead Holter ECG and am sure that he has been suffering from a night terror.  I explained the Holter ECG results and this diagnosis to him.  I recommended him to try Ativan and see how it works and he agreed with this treatment plan.  We will start him on Ativan 2 mg before bed.  If it works, I am more sure about that diagnosis and will refer him to psychology.  Hunter Fuller

## 2022-06-16 RX ORDER — LORAZEPAM 1 MG/1
TABLET ORAL
Qty: 60 TABLET | Refills: 0
Start: 2022-06-16

## 2022-06-16 NOTE — TELEPHONE ENCOUNTER
Rx for Lorazepam 1mg tablets, take 1-2 tablets at night #60, 0 refills, called in to [facility] in [facility].   Mena Correa RN on 6/16/2022 at 3:30 PM

## 2022-06-17 ENCOUNTER — TELEPHONE (OUTPATIENT)
Dept: CARDIOLOGY | Facility: CLINIC | Age: 52
End: 2022-06-17

## 2022-06-17 NOTE — TELEPHONE ENCOUNTER
----- Message from Mena Correa RN sent at 6/16/2022  3:13 PM CDT -----  Shaun Roberson,   He can be seen afterwards. We want him to trial the medication for a while first. You can use a new ep spot if you want.    Thanks  Mena    ----- Message -----  From: Karol Sandra  Sent: 6/16/2022   3:04 PM CDT  To: Mena Correa RN    I just spoke with the pt and he is not able to do the week of 6/27. He said he can either before or after that week. Next available is 7/29. Does he need to be seen before then? I see a couple new EP spots on 6/20.    Thanks!  Karol  ----- Message -----  From: Mena Correa RN  Sent: 6/16/2022   2:52 PM CDT  To: Karol Roberson,   It's just like a pacemaker check. I forgot he gets it checked elsewhere tho. You can just tell the patient to make sure he gets his device checked prior to coming for the appointment with Dr Fuller.     Thanks  Mena    ----- Message -----  From: Karol Sandra  Sent: 6/16/2022   2:34 PM CDT  To: QUENTIN Dela Cruz,    I don't see any requests for this pt. I am not sure how to sched a ILR interrogation? I guess I have not heard this yet!    Karol  ----- Message -----  From: Mena Correa RN  Sent: 6/10/2022   1:45 PM CDT  To: Clinic Coordin[facility]-    Please call pt and assist in scheduling the below    Thanks  URBANO Bates, RN  Electrophysiology Nurse Coordinator    ----- Message -----  From: Hunter Fuller MD  Sent: 6/10/2022   1:09 PM CDT  To: Mena Correa RN    Please schedule him to my clinic with ILR interrogation on 6/27.  Thanks.  Hunter Fuller

## 2022-06-17 NOTE — TELEPHONE ENCOUNTER
6/17 LVM for pt to resched appt, Pt can use new EP spot to get in sooner with Dr. Fuller. Pt on trip week of 6/27 so needs to be following this. Plz tell pt to get device check prior to appt (he does this at another location).

## 2022-06-28 ENCOUNTER — MYC MEDICAL ADVICE (OUTPATIENT)
Dept: CARDIOLOGY | Facility: CLINIC | Age: 52
End: 2022-06-28

## 2022-06-28 DIAGNOSIS — Z86.59 HISTORY OF NIGHT TERRORS: Primary | ICD-10-CM

## 2022-06-28 DIAGNOSIS — G47.00 FREQUENT NOCTURNAL AWAKENING: ICD-10-CM

## 2022-07-18 ENCOUNTER — OFFICE VISIT (OUTPATIENT)
Dept: CARDIOLOGY | Facility: CLINIC | Age: 52
End: 2022-07-18
Attending: INTERNAL MEDICINE
Payer: OTHER GOVERNMENT

## 2022-07-18 VITALS
WEIGHT: 250.5 LBS | HEART RATE: 64 BPM | BODY MASS INDEX: 33.93 KG/M2 | HEIGHT: 72 IN | DIASTOLIC BLOOD PRESSURE: 83 MMHG | SYSTOLIC BLOOD PRESSURE: 137 MMHG | OXYGEN SATURATION: 96 %

## 2022-07-18 DIAGNOSIS — I47.10 SVT (SUPRAVENTRICULAR TACHYCARDIA) (H): Primary | ICD-10-CM

## 2022-07-18 DIAGNOSIS — R00.0 SINUS TACHYCARDIA: ICD-10-CM

## 2022-07-18 PROCEDURE — G0463 HOSPITAL OUTPT CLINIC VISIT: HCPCS

## 2022-07-18 PROCEDURE — 99215 OFFICE O/P EST HI 40 MIN: CPT | Performed by: INTERNAL MEDICINE

## 2022-07-18 ASSESSMENT — PAIN SCALES - GENERAL: PAINLEVEL: NO PAIN (0)

## 2022-07-18 NOTE — PATIENT INSTRUCTIONS
You were seen today in the Cardiovascular Clinic at the [facility].   Cardiology Providers you saw during your visit:    Dr. Fuller    Recommendations:   Ativan for sleep    Follow-up:   Follow up to be determined after you have taken medication for one week.  Please send us a [employer] message with update.    For emergencies call 911.     If you have any questions regarding your visit please contact your care team:     Mena Correa RN  [facility]  Cardiology Care Coordinator-EP    Appointment scheduling or nurse questions: [phone]    On Call Cardiologist for after hours or on weekends: [phone]    option #4    If you need a medication refill please contact your pharmacy.  Please allow 3 business days for your refill to be completed.    As always, thank you for trusting us with your health care needs!

## 2022-07-18 NOTE — NURSING NOTE
Chief Complaint   Patient presents with     New Patient     2 mo f/u for tachycardia, had ILR     Vitals were taken and medications reconciled.    Shaka Vale, EMT  1:59 PM

## 2022-07-18 NOTE — PROGRESS NOTES
HPI:   Malcolm Rosa is a 53 yo male with PMH of HTN, HLD, fatty liver, GERD, chronic chest pain, CY, and nocturnal tachycardia s/p ILR placement.  He was referred for a cardiology evaluation.  He loretta been suffering from tachycardia during sleep, which presented with a sudden onset and gradual offset. It woke him up a couple of times every night with an hour interval. The first one usually occurred at 11PM-1AM.  He has had no tachycardia during daytime.  He reported that Versed cold induce his tachycardia.   He changed multiple things in his daily life, but nothing affected his symptoms.  He underwent EP study on 4/28/2022 when no SVT was induced.  He was then admitted to the hospital to monitor overnight but had no events.  He then wore Holter ECG which suggested that the tachycardia was more likely to be sinus tachycardia rather than atrial tachycardia because he woke up (artifact was recorded) first before the tachycardia started and a short [place] interval during the tachycardia and gradual offset of the tachycardia were noted.  He reported that taking Mg and CoQ10 might prevent those events but they could have been a placebo.  To rule out atrial tachycardia, He wore a 12-lead Holter ECG that suggest that the episodes were sinus tachycardias.  He tried CPAP, which did not work.  He is now seen to discuss treatment options.    PAST MEDICAL HISTORY:  Past Medical History:   Diagnosis Date     Abnormal laboratory test     slightly elevated glucagon     Benign essential hypertension      Chronic fatigue syndrome      Epistaxis      Fatty liver      Gynecomastia      Herniated cervical disc without myelopathy      Hyperglycemia      Hypophosphatemia      Insomnia      Left varicocele      Prostatic hypertrophy      Pure hypercholesterolemia        CURRENT MEDICATIONS:  Current Outpatient Medications   Medication Sig Dispense Refill     LORazepam (ATIVAN) 1 MG tablet Take 1-2 tablets by mouth at bedtime 60 tablet 0      omeprazole (PRILOSEC) 20 MG DR capsule Take 20 mg by mouth         PAST SURGICAL HISTORY:  Past Surgical History:   Procedure Laterality Date     ARTHROSCOPY KNEE BILATERAL       CHOLECYSTECTOMY  11/13/15     EP ABLATION SVT N/A 4/28/2022    Procedure: Ablation Supraventricular Tachycardia [[id number]];  Surgeon: Hunter Fuller MD;  Location:  [facility]     FUSION CERVICAL ANTERIOR ONE LEVEL      C6-7     HERNIA REPAIR Bilateral     inguinal and umbilical     VARICOCELECTOMY Left      VASECTOMY         ALLERGIES:     Allergies   Allergen Reactions     Diphenhydramine Other (See Comments) and Unknown     VERY SENSITIVE TO IT.  Makes goofy  VERY SENSITIVE TO IT.  Makes goofy  Makes goofy  VERY SENSITIVE TO IT.  VERY SENSITIVE TO IT.  Makes goofy       Epinephrine Other (See Comments)     Other reaction(s): Tachycardia  B/P  Issues. Pt is unsure if he becomes hypotensive or hypertensive.   B/P  issues       Prednisone Other (See Comments)     In high doses. Became nauseated and confused also.     Sulfamethoxazole W/Trimethoprim Anxiety and Rash     Mild itching with red bumpy rash and also made me real anxious  Mild itching with red bumpy rash and also made me real anxious         FAMILY HISTORY:  + Premature coronary artery disease  - Atrial fibrillation  + Sudden cardiac death     SOCIAL HISTORY:  Social History     Tobacco Use     Smoking status: Never Smoker     Smokeless tobacco: Never Used       ROS:   Constitutional: No fever, chills, or sweats. Weight stable.   ENT: No visual disturbance, ear ache, epistaxis, sore throat.   Cardiovascular: As per HPI.   Respiratory: No cough, hemoptysis.    GI: No nausea, vomiting, hematemesis, melena, or hematochezia.   : No hematuria.   Integument: Negative.   Psychiatric: Negative.   Hematologic:  Easy bruising, no easy bleeding.  Neuro: Negative.   Endocrinology: No significant heat or cold intolerance   Musculoskeletal: No myalgia.    Exam:  /83  "(BP Location: Right arm, Patient Position: Chair, Cuff Size: Adult Large)   Pulse 64   Ht 1.826 m (5' 11.89\")   Wt 113.6 kg (250 lb 8 oz)   SpO2 96%   BMI 34.08 kg/m    GENERAL APPEARANCE: healthy, alert and no distress  HEENT: no icterus, no xanthelasmas, normal pupil size and reaction, normal palate, mucosa moist, no central cyanosis  NECK: no adenopathy, no asymmetry, masses, or scars, thyroid normal to palpation and no bruits, JVP not elevated  RESPIRATORY: lungs clear to auscultation - no rales, rhonchi or wheezes, no use of accessory muscles, no retractions, respirations are unlabored, normal respiratory rate  CARDIOVASCULAR: regular rhythm, normal S1 with physiologic split S2, no S3 or S4 and no murmur, click or rub, precordium quiet with normal PMI.  ABDOMEN: soft, non tender, without hepatosplenomegaly, no masses palpable, bowel sounds normal, aorta not enlarged by palpation, no abdominal bruits  EXTREMITIES: peripheral pulses normal, no edema, no bruits  NEURO: alert and oriented to person/place/time, normal speech, gait and affect  VASC: Radial, femoral, dorsalis pedis and posterior tibialis pulses are normal in volumes and symmetric bilaterally. No bruits are heard.  SKIN: no ecchymoses, no rashes    Labs:  CBC RESULTS:   Lab Results   Component Value Date    WBC 7.6 04/28/2022    RBC 5.56 04/28/2022    HGB 16.4 04/28/2022    HCT 48.4 04/28/2022    MCV 87 04/28/2022    MCH 29.5 04/28/2022    MCHC 33.9 04/28/2022    RDW 13.2 04/28/2022     04/28/2022       BMP RESULTS:  Lab Results   Component Value Date     04/28/2022    POTASSIUM 4.2 04/28/2022    CHLORIDE 110 (H) 04/28/2022    CO2 27 04/28/2022    ANIONGAP 4 04/28/2022     (H) 04/28/2022    BUN 14 04/28/2022    CR 1.05 04/28/2022    GFRESTIMATED 84 04/28/2022    XOCHITL 9.3 04/28/2022        INR RESULTS:  No results found for: INR    Procedures:  ECG on 4/25/2022: Reviewed.  WNL.    3/14/2022 lexiscan WNL  3/20/22 Echo WNL  ILR " checks 1/17/22 - reports NSR but then a faster episode, 4/19/22 arminda at night, 1 symptom event (in care everywhere)    ILR interrogation on 4/22/2022: Reviewed.  Remote Impression and Plan   Place of Service Home;ILR;Alert   Final Impression Normal Remote Monitor with Events   Implanting Physician Dr. Albarado   EF 55   EF Tests Echo   EF Test DOS 10/27/21   Events/Comments Alert for 2 Tachy and 2 symptom activated events (BSCLUX)Tachy episodes T-7,8 occured 4/21/22 between 13:20 and 13:29 with v rates 143-146bpm, duration 18-19seconds, supports ST, Symptom PT-50 occured while in bed, c/o heart racing occured 4/22/22 @00:22 supports ST @ 110bpm, PT-49 occured 4/21/22 @01:22 while in bed, c/o heart racing egm supports SR @ 60-62bpm   In Basket Sent to Víctor Manrique PA-C   Follow Up Plan follow-up as scheduled;1 month remote monitor   Plan of Care Full report under Media/CV tab   Anticoagulation No   Battery OK   Heart Rate Histograms Adequate histograms   Non-sustained 2   E-grams Support (appears to be) Sinus Tachycardia   Presenting Rhythm VS   Presenting Heart Rate 79   Indication VT           Holter ECG in 4/2022: Reviewed.        ILR interrogation on 5/16/2022: Reviewed.            Assessment and Plan:   # HTN  # HLD  # Fatty liver  # GERD  # Chronic chest pain  # CY  # Normal lexiscan on 3/14/2022.   # Normal TTE on 3/20/22  # s/p ILR placement.  # Nocturnal tachycardia -> SVT. No specific triggers. Induced by Versed.  His SVT is likely to be atrial tachycardia vs sinus tachycardia.  He underwent EP study on 4/28/2022 when no SVT was induced.  He was then admitted to the hospital to monitor overnight but had no events.  He then wore Holter ECG which suggested that the tachycardia was more likely to be sinus tachycardia rather than atrial tachycardia because he woke up (artifact was recorded) first before the tachycardia started and a short [place] interval during the tachycardia and gradual offset of the  tachycardia were noted.  At this point, I would think that he is most likely to be having night terror.  He reported that taking Mg and CoQ10 might prevent those events but they could have been a placebo.  To rule out atrial tachycardia, He wore a 12-lead Holter ECG that suggest that the episodes were sinus tachycardias.  Because he is likely suffering from night terror, I advised him to try Ativan.  He was reluctant to take it and we discussed use of low dose beta blocker or Ativan.  Considering his baseline sinus bradycardia, he understood that Ativan is safer to try first and agreed to do it.  He will try Ativan for a week and let me know how it works.    I spent a total of 45 min today to review the records, see the patient, and complete the documents.    CC  Patient Care Team:  Dee Damon MD as PCP - Min Chung MD as Referring Physician  Phillip Blanco MD as MD (Dermapathology)  Hunter Fuller MD (Cardiovascular Disease)  Mena Correa, RN as Specialty Care Coordinator  Hunter Fuller MD as Assigned Heart and Vascular Provider  DEE DAMON

## 2022-07-18 NOTE — LETTER
7/18/2022      RE: Malcolm Rosa  [facility] [place] [id number]       Dear Colleague,    Thank you for the opportunity to participate in the care of your patient, Malcolm Rosa, at the [facility] [place] at [facility]. Please see a copy of my visit note below.    HPI:   Malcolm Rosa is a 51 yo male with PMH of HTN, HLD, fatty liver, GERD, chronic chest pain, CY, and nocturnal tachycardia s/p ILR placement.  He was referred for a cardiology evaluation.  He loretta been suffering from tachycardia during sleep, which presented with a sudden onset and gradual offset. It woke him up a couple of times every night with an hour interval. The first one usually occurred at 11PM-1AM.  He has had no tachycardia during daytime.  He reported that Versed cold induce his tachycardia.   He changed multiple things in his daily life, but nothing affected his symptoms.  He underwent EP study on 4/28/2022 when no SVT was induced.  He was then admitted to the hospital to monitor overnight but had no events.  He then wore Holter ECG which suggested that the tachycardia was more likely to be sinus tachycardia rather than atrial tachycardia because he woke up (artifact was recorded) first before the tachycardia started and a short [place] interval during the tachycardia and gradual offset of the tachycardia were noted.  He reported that taking Mg and CoQ10 might prevent those events but they could have been a placebo.  To rule out atrial tachycardia, He wore a 12-lead Holter ECG that suggest that the episodes were sinus tachycardias.  He tried CPAP, which did not work.  He is now seen to discuss treatment options.    PAST MEDICAL HISTORY:  Past Medical History:   Diagnosis Date     Abnormal laboratory test     slightly elevated glucagon     Benign essential hypertension      Chronic fatigue syndrome      Epistaxis      Fatty liver      Gynecomastia       Herniated cervical disc without myelopathy      Hyperglycemia      Hypophosphatemia      Insomnia      Left varicocele      Prostatic hypertrophy      Pure hypercholesterolemia        CURRENT MEDICATIONS:  Current Outpatient Medications   Medication Sig Dispense Refill     LORazepam (ATIVAN) 1 MG tablet Take 1-2 tablets by mouth at bedtime 60 tablet 0     omeprazole (PRILOSEC) 20 MG DR capsule Take 20 mg by mouth         PAST SURGICAL HISTORY:  Past Surgical History:   Procedure Laterality Date     ARTHROSCOPY KNEE BILATERAL       CHOLECYSTECTOMY  11/13/15     EP ABLATION SVT N/A 4/28/2022    Procedure: Ablation Supraventricular Tachycardia [[id number]];  Surgeon: Hunter Fuller MD;  Location:  HEART CARDIAC CATH LAB     FUSION CERVICAL ANTERIOR ONE LEVEL      C6-7     HERNIA REPAIR Bilateral     inguinal and umbilical     VARICOCELECTOMY Left      VASECTOMY         ALLERGIES:     Allergies   Allergen Reactions     Diphenhydramine Other (See Comments) and Unknown     VERY SENSITIVE TO IT.  Makes goofy  VERY SENSITIVE TO IT.  Makes goofy  Makes goofy  VERY SENSITIVE TO IT.  VERY SENSITIVE TO IT.  Makes goofy       Epinephrine Other (See Comments)     Other reaction(s): Tachycardia  B/P  Issues. Pt is unsure if he becomes hypotensive or hypertensive.   B/P  issues       Prednisone Other (See Comments)     In high doses. Became nauseated and confused also.     Sulfamethoxazole W/Trimethoprim Anxiety and Rash     Mild itching with red bumpy rash and also made me real anxious  Mild itching with red bumpy rash and also made me real anxious         FAMILY HISTORY:  + Premature coronary artery disease  - Atrial fibrillation  + Sudden cardiac death     SOCIAL HISTORY:  Social History     Tobacco Use     Smoking status: Never Smoker     Smokeless tobacco: Never Used       ROS:   Constitutional: No fever, chills, or sweats. Weight stable.   ENT: No visual disturbance, ear ache, epistaxis, sore throat.   Cardiovascular: As per  "HPI.   Respiratory: No cough, hemoptysis.    GI: No nausea, vomiting, hematemesis, melena, or hematochezia.   : No hematuria.   Integument: Negative.   Psychiatric: Negative.   Hematologic:  Easy bruising, no easy bleeding.  Neuro: Negative.   Endocrinology: No significant heat or cold intolerance   Musculoskeletal: No myalgia.    Exam:  /83 (BP Location: Right arm, Patient Position: Chair, Cuff Size: Adult Large)   Pulse 64   Ht 1.826 m (5' 11.89\")   Wt 113.6 kg (250 lb 8 oz)   SpO2 96%   BMI 34.08 kg/m    GENERAL APPEARANCE: healthy, alert and no distress  HEENT: no icterus, no xanthelasmas, normal pupil size and reaction, normal palate, mucosa moist, no central cyanosis  NECK: no adenopathy, no asymmetry, masses, or scars, thyroid normal to palpation and no bruits, JVP not elevated  RESPIRATORY: lungs clear to auscultation - no rales, rhonchi or wheezes, no use of accessory muscles, no retractions, respirations are unlabored, normal respiratory rate  CARDIOVASCULAR: regular rhythm, normal S1 with physiologic split S2, no S3 or S4 and no murmur, click or rub, precordium quiet with normal PMI.  ABDOMEN: soft, non tender, without hepatosplenomegaly, no masses palpable, bowel sounds normal, aorta not enlarged by palpation, no abdominal bruits  EXTREMITIES: peripheral pulses normal, no edema, no bruits  NEURO: alert and oriented to person/place/time, normal speech, gait and affect  VASC: Radial, femoral, dorsalis pedis and posterior tibialis pulses are normal in volumes and symmetric bilaterally. No bruits are heard.  SKIN: no ecchymoses, no rashes    Labs:  CBC RESULTS:   Lab Results   Component Value Date    WBC 7.6 04/28/2022    RBC 5.56 04/28/2022    HGB 16.4 04/28/2022    HCT 48.4 04/28/2022    MCV 87 04/28/2022    MCH 29.5 04/28/2022    MCHC 33.9 04/28/2022    RDW 13.2 04/28/2022     04/28/2022       BMP RESULTS:  Lab Results   Component Value Date     04/28/2022    POTASSIUM 4.2 " 04/28/2022    CHLORIDE 110 (H) 04/28/2022    CO2 27 04/28/2022    ANIONGAP 4 04/28/2022     (H) 04/28/2022    BUN 14 04/28/2022    CR 1.05 04/28/2022    GFRESTIMATED 84 04/28/2022    XOCHITL 9.3 04/28/2022        INR RESULTS:  No results found for: INR    Procedures:  ECG on 4/25/2022: Reviewed.  WNL.    3/14/2022 lexiscan WNL  3/20/22 Echo WNL  ILR checks 1/17/22 - reports NSR but then a faster episode, 4/19/22 arminda at night, 1 symptom event (in care everywhere)    ILR interrogation on 4/22/2022: Reviewed.  Remote Impression and Plan   Place of Service Home;ILR;Alert   Final Impression Normal Remote Monitor with Events   Implanting Physician Dr. Albarado   EF 55   EF Tests Echo   EF Test DOS 10/27/21   Events/Comments Alert for 2 Tachy and 2 symptom activated events (BSCLUX)Tachy episodes T-7,8 occured 4/21/22 between 13:20 and 13:29 with v rates 143-146bpm, duration 18-19seconds, supports ST, Symptom PT-50 occured while in bed, c/o heart racing occured 4/22/22 @00:22 supports ST @ 110bpm, PT-49 occured 4/21/22 @01:22 while in bed, c/o heart racing egm supports SR @ 60-62bpm   In Basket Sent to Víctor Manrique PA-C   Follow Up Plan follow-up as scheduled;1 month remote monitor   Plan of Care Full report under Media/CV tab   Anticoagulation No   Battery OK   Heart Rate Histograms Adequate histograms   Non-sustained 2   E-grams Support (appears to be) Sinus Tachycardia   Presenting Rhythm VS   Presenting Heart Rate 79   Indication VT           Holter ECG in 4/2022: Reviewed.        ILR interrogation on 5/16/2022: Reviewed.            Assessment and Plan:   # HTN  # HLD  # Fatty liver  # GERD  # Chronic chest pain  # CY  # Normal lexiscan on 3/14/2022.   # Normal TTE on 3/20/22  # s/p ILR placement.  # Nocturnal tachycardia -> SVT. No specific triggers. Induced by Versed.  His SVT is likely to be atrial tachycardia vs sinus tachycardia.  He underwent EP study on 4/28/2022 when no SVT was induced.  He was then  admitted to the hospital to monitor overnight but had no events.  He then wore Holter ECG which suggested that the tachycardia was more likely to be sinus tachycardia rather than atrial tachycardia because he woke up (artifact was recorded) first before the tachycardia started and a short [place] interval during the tachycardia and gradual offset of the tachycardia were noted.  At this point, I would think that he is most likely to be having night terror.  He reported that taking Mg and CoQ10 might prevent those events but they could have been a placebo.  To rule out atrial tachycardia, He wore a 12-lead Holter ECG that suggest that the episodes were sinus tachycardias.  Because he is likely suffering from night terror, I advised him to try Ativan.  He was reluctant to take it and we discussed use of low dose beta blocker or Ativan.  Considering his baseline sinus bradycardia, he understood that Ativan is safer to try first and agreed to do it.  He will try Ativan for a week and let me know how it works.    I spent a total of 45 min today to review the records, see the patient, and complete the documents.    CC  Patient Care Team:  Dee Damon MD as PCP - Min Chung MD as Referring Physician  Phillip Blanco MD as MD (Dermapathology)  Hunter Fuller MD (Cardiovascular Disease)  Mena Correa, RN as Specialty Care Coordinator  Hunter Fuller MD as Assigned Heart and Vascular Provider  DEE DAMON        Please do not hesitate to contact me if you have any questions/concerns.     Sincerely,     Hunter Fuller MD

## 2023-04-22 ENCOUNTER — HEALTH MAINTENANCE LETTER (OUTPATIENT)
Age: 53
End: 2023-04-22

## 2024-06-29 ENCOUNTER — HEALTH MAINTENANCE LETTER (OUTPATIENT)
Age: 54
End: 2024-06-29

## 2025-07-13 ENCOUNTER — HEALTH MAINTENANCE LETTER (OUTPATIENT)
Age: 55
End: 2025-07-13

## (undated) DEVICE — INTRODUCER SHEATH FAST-CATH CATH-LOCK 6FRX12CM 406701

## (undated) DEVICE — KIT VENOUS FLUSH 60210177

## (undated) DEVICE — ELECTRODE DEFIB CADENCE 22550R

## (undated) DEVICE — PACK HEART LEFT CUSTOM

## (undated) DEVICE — PATCH CARTO 3 EXTERNAL REFERENCE 3D MAPPING CREFP6

## (undated) DEVICE — TUBE SET SMARKABLATE IRRIGATION

## (undated) DEVICE — CATHETER EPT POLARIS X 6FR STEERABLE M0047003D0

## (undated) DEVICE — REPRO DAIG RSPN FX CRV DX EP CATH 6F

## (undated) DEVICE — CATH EP REPRO DAIG SPRM FX CRV DX EP C

## (undated) DEVICE — INTRODUCER SHEATH FAST-CATH CATH-LOCK 7FRX12CM 406702

## (undated) DEVICE — INTRO CATH 12CM 8.5FR FST-CATH

## (undated) DEVICE — INTRO SHEATH MICRO PLATINUM TIP 4FRX40CM 7274

## (undated) RX ORDER — ACETAMINOPHEN 500 MG
TABLET ORAL
Status: DISPENSED
Start: 2022-04-28

## (undated) RX ORDER — SODIUM CHLORIDE 9 MG/ML
INJECTION, SOLUTION INTRAVENOUS
Status: DISPENSED
Start: 2022-04-28

## (undated) RX ORDER — FENTANYL CITRATE 50 UG/ML
INJECTION, SOLUTION INTRAMUSCULAR; INTRAVENOUS
Status: DISPENSED
Start: 2022-04-28

## (undated) RX ORDER — LIDOCAINE HYDROCHLORIDE 10 MG/ML
INJECTION, SOLUTION EPIDURAL; INFILTRATION; INTRACAUDAL; PERINEURAL
Status: DISPENSED
Start: 2022-04-28

## (undated) RX ORDER — MIDAZOLAM HCL IN 0.9 % NACL/PF 1 MG/ML
PLASTIC BAG, INJECTION (ML) INTRAVENOUS
Status: DISPENSED
Start: 2022-04-28